# Patient Record
Sex: FEMALE | Race: WHITE | NOT HISPANIC OR LATINO | Employment: FULL TIME | URBAN - METROPOLITAN AREA
[De-identification: names, ages, dates, MRNs, and addresses within clinical notes are randomized per-mention and may not be internally consistent; named-entity substitution may affect disease eponyms.]

---

## 2019-09-10 ENCOUNTER — OFFICE VISIT (OUTPATIENT)
Dept: INTERNAL MEDICINE | Facility: CLINIC | Age: 34
End: 2019-09-10
Payer: COMMERCIAL

## 2019-09-10 VITALS
SYSTOLIC BLOOD PRESSURE: 110 MMHG | DIASTOLIC BLOOD PRESSURE: 80 MMHG | WEIGHT: 164.25 LBS | TEMPERATURE: 98 F | HEIGHT: 67 IN | RESPIRATION RATE: 14 BRPM | HEART RATE: 60 BPM | BODY MASS INDEX: 25.78 KG/M2

## 2019-09-10 DIAGNOSIS — R25.1 TREMOR: Chronic | ICD-10-CM

## 2019-09-10 DIAGNOSIS — G44.89 OTHER HEADACHE SYNDROME: Chronic | ICD-10-CM

## 2019-09-10 DIAGNOSIS — Z01.419 WELL WOMAN EXAM: ICD-10-CM

## 2019-09-10 DIAGNOSIS — Z85.41 HISTORY OF CERVICAL CANCER: ICD-10-CM

## 2019-09-10 DIAGNOSIS — J32.0 CHRONIC MAXILLARY SINUSITIS: Primary | ICD-10-CM

## 2019-09-10 DIAGNOSIS — M79.7 FIBROMYALGIA: ICD-10-CM

## 2019-09-10 DIAGNOSIS — Z80.41 FAMILY HISTORY OF OVARIAN CANCER: ICD-10-CM

## 2019-09-10 PROCEDURE — 99999 PR PBB SHADOW E&M-NEW PATIENT-LVL V: CPT | Mod: PBBFAC,,, | Performed by: INTERNAL MEDICINE

## 2019-09-10 PROCEDURE — 3008F PR BODY MASS INDEX (BMI) DOCUMENTED: ICD-10-PCS | Mod: CPTII,S$GLB,, | Performed by: INTERNAL MEDICINE

## 2019-09-10 PROCEDURE — 99204 OFFICE O/P NEW MOD 45 MIN: CPT | Mod: S$GLB,,, | Performed by: INTERNAL MEDICINE

## 2019-09-10 PROCEDURE — 3008F BODY MASS INDEX DOCD: CPT | Mod: CPTII,S$GLB,, | Performed by: INTERNAL MEDICINE

## 2019-09-10 PROCEDURE — 99999 PR PBB SHADOW E&M-NEW PATIENT-LVL V: ICD-10-PCS | Mod: PBBFAC,,, | Performed by: INTERNAL MEDICINE

## 2019-09-10 PROCEDURE — 99204 PR OFFICE/OUTPT VISIT, NEW, LEVL IV, 45-59 MIN: ICD-10-PCS | Mod: S$GLB,,, | Performed by: INTERNAL MEDICINE

## 2019-09-10 RX ORDER — LEVOFLOXACIN 500 MG/1
500 TABLET, FILM COATED ORAL DAILY
Qty: 10 TABLET | Refills: 0 | Status: SHIPPED | OUTPATIENT
Start: 2019-09-10 | End: 2019-09-20

## 2019-09-10 RX ORDER — PROPRANOLOL HYDROCHLORIDE 20 MG/1
TABLET ORAL
COMMUNITY
Start: 2019-09-02 | End: 2020-05-11 | Stop reason: SDUPTHER

## 2019-09-10 NOTE — PROGRESS NOTES
Subjective:       Patient ID: Socorro Palafox is a 33 y.o. female.    Chief Complaint: Sinusitis and Establish Care    HPI     33-year-old female here for evaluation of a possible sinus drainage that started two years ago.  She reports that she was told this is a cold.  She was given antibiotics. She moved here and went to an ENT.  She was given abx and steroids again. She had surgery in June.  The septum was corrected and the turbinates were opened up.  She was on three rounds of antibiotics.  She still has symptoms.  She went to Dr. Blackwell at [facility].  The surgery was June 24th.  She had an allergy test done that showed nothing significant.  She has been on keflex and prednisone.  Two months later, she repeated this, then again right before the surgery.  She had augmentin for this at first.  While she is on the antibiotics and steroids, she feels better.  The augmentin by itself did not stop the pain in the face, but did stop the fever.  She had a CT of her sinuses, which showed left side of her sinuses impacted and right was half.  The upper sinuses were ok.  She has not had imaging since her surgery.  She has tried flonase to help, but ended up with nose bleeds.  This made everything sticky and dry.  She tried to use this two days ago.  She has not been using afrin chronically.    She has chronic headaches and tremors.  She had an MRI of her brain that showed more fluid than normal for her age and a congenital malformation.    Review of Systems   Constitutional: Negative for chills, fever and unexpected weight change.   HENT: Negative for congestion, postnasal drip and sore throat.    Eyes: Negative for redness and visual disturbance.   Respiratory: Negative for cough and shortness of breath.    Cardiovascular: Negative for chest pain and palpitations.   Gastrointestinal: Negative for abdominal pain, constipation, diarrhea, nausea and vomiting.   Genitourinary: Negative for dysuria, frequency  and hematuria.   Musculoskeletal: Negative for arthralgias and myalgias.   Skin: Negative for color change and rash.   Neurological: Negative for dizziness and headaches.       Objective:      Physical Exam   Constitutional: She is oriented to person, place, and time. She appears well-developed and well-nourished.   HENT:   Head: Normocephalic and atraumatic.   Mouth/Throat: No oropharyngeal exudate.   Eyes: Pupils are equal, round, and reactive to light. EOM are normal. Right eye exhibits no discharge. Left eye exhibits no discharge. No scleral icterus.   Neck: Normal range of motion. Neck supple. No tracheal deviation present. No thyromegaly present.   Cardiovascular: Normal rate, regular rhythm and normal heart sounds. Exam reveals no gallop and no friction rub.   No murmur heard.  Pulmonary/Chest: Effort normal and breath sounds normal. No respiratory distress. She has no wheezes. She has no rales. She exhibits no tenderness.   Abdominal: Soft. Bowel sounds are normal. She exhibits no distension and no mass. There is no tenderness. There is no rebound and no guarding.   Musculoskeletal: Normal range of motion. She exhibits no edema or tenderness.   Neurological: She is alert and oriented to person, place, and time.   Skin: Skin is warm and dry. No rash noted. No erythema. No pallor.   Psychiatric: She has a normal mood and affect. Her behavior is normal.   Vitals reviewed.      Assessment:       1. Chronic maxillary sinusitis    2. Well woman exam    3. History of cervical cancer    4. Family history of ovarian cancer    5. Fibromyalgia    6. Other headache syndrome    7. Tremor        Plan:       1.  Check CT sinuses and refer to ENT.  Try levaquin 500 mg daily.  2/3/4.  Refer to gynecology appear  5.  Monitor  6/7.  Refer to Neurology.  Continue propranolol 20 mg.

## 2019-09-16 ENCOUNTER — TELEPHONE (OUTPATIENT)
Dept: INTERNAL MEDICINE | Facility: CLINIC | Age: 34
End: 2019-09-16

## 2019-09-16 ENCOUNTER — HOSPITAL ENCOUNTER (OUTPATIENT)
Dept: RADIOLOGY | Facility: HOSPITAL | Age: 34
Discharge: HOME | End: 2019-09-16
Attending: INTERNAL MEDICINE
Payer: COMMERCIAL

## 2019-09-16 DIAGNOSIS — J32.0 CHRONIC MAXILLARY SINUSITIS: ICD-10-CM

## 2019-09-16 PROCEDURE — 70486 CT SINUSES WITHOUT CONTRAST: ICD-10-PCS | Mod: 26,,, | Performed by: RADIOLOGY

## 2019-09-16 PROCEDURE — 70486 CT MAXILLOFACIAL W/O DYE: CPT | Mod: 26,,, | Performed by: RADIOLOGY

## 2019-09-16 PROCEDURE — 70486 CT MAXILLOFACIAL W/O DYE: CPT | Mod: TC

## 2019-09-16 NOTE — TELEPHONE ENCOUNTER
Notify pt of Ct results:    - paranasal sinus disease; possible mucus cyst vs thickinging  - possible acute sinusitis  - dr. Rios treated her with levaquin last week; if she still symptomatic?   - pt needs to see ENT; he already placed a referral  - we could try a steroidal nasal spray to see if that helps her symptoms until she sees ENT if she likes

## 2019-09-23 ENCOUNTER — PATIENT OUTREACH (OUTPATIENT)
Dept: ADMINISTRATIVE | Facility: HOSPITAL | Age: 34
End: 2019-09-23

## 2019-09-23 ENCOUNTER — OFFICE VISIT (OUTPATIENT)
Dept: OTOLARYNGOLOGY | Facility: CLINIC | Age: 34
End: 2019-09-23
Payer: COMMERCIAL

## 2019-09-23 VITALS
DIASTOLIC BLOOD PRESSURE: 78 MMHG | BODY MASS INDEX: 24.94 KG/M2 | HEART RATE: 67 BPM | TEMPERATURE: 97 F | SYSTOLIC BLOOD PRESSURE: 106 MMHG | WEIGHT: 158.94 LBS | HEIGHT: 67 IN

## 2019-09-23 DIAGNOSIS — J31.0 CHRONIC RHINITIS: ICD-10-CM

## 2019-09-23 DIAGNOSIS — J32.8 OTHER CHRONIC SINUSITIS: Primary | ICD-10-CM

## 2019-09-23 DIAGNOSIS — J34.3 HYPERTROPHY OF INFERIOR NASAL TURBINATE: ICD-10-CM

## 2019-09-23 PROCEDURE — 31231 NASAL ENDOSCOPY DX: CPT | Mod: S$GLB,,, | Performed by: OTOLARYNGOLOGY

## 2019-09-23 PROCEDURE — 99999 PR PBB SHADOW E&M-EST. PATIENT-LVL III: CPT | Mod: PBBFAC,,, | Performed by: OTOLARYNGOLOGY

## 2019-09-23 PROCEDURE — 31231 PR NASAL ENDOSCOPY, DX: ICD-10-PCS | Mod: S$GLB,,, | Performed by: OTOLARYNGOLOGY

## 2019-09-23 PROCEDURE — 99243 PR OFFICE CONSULTATION,LEVEL III: ICD-10-PCS | Mod: 25,S$GLB,, | Performed by: OTOLARYNGOLOGY

## 2019-09-23 PROCEDURE — 99999 PR PBB SHADOW E&M-EST. PATIENT-LVL III: ICD-10-PCS | Mod: PBBFAC,,, | Performed by: OTOLARYNGOLOGY

## 2019-09-23 PROCEDURE — 99243 OFF/OP CNSLTJ NEW/EST LOW 30: CPT | Mod: 25,S$GLB,, | Performed by: OTOLARYNGOLOGY

## 2019-09-23 NOTE — PROGRESS NOTES
Chief Complaint   Patient presents with    Sinusitis     Referred by Dr.Ryan Rios   .    HPI:     Socorro Palafox is a 33 y.o. female who is referred by Dr. Amanuel Rios MD for evaluation of chronic rhinosinusitis.  She relates that she has had a several year history of chronic rhinosinusitis.  She moved to the oral and area from [place] in January of 2019.  Her sinonasal symptoms worsened and was not controlled by medical management therefore she underwent septoplasty, SMR T, and endoscopic sinus surgery with Dr. Cristina Raman MD in June of 2019. Initially after surgery, Dr. Raman prescribed a 1 month course of compounded rinses Tobramycin and budesonide in July on 2019.  She relates that she had normal allergy testing upon workup with Dr. Raman.    She   presents today for evaluation of a several day history of nasal congestion and postnasal drip.  She does use sinus rinses or nasal sprays. She denies midface pain and pressure.  She admits to rhinorrhea and postnasal drip. There is not maxillary tooth pain. She  admits to headaches.    Most recently she was treated with Levaquin 500mg PO daily for 10 days. She feels this has helped with her symptoms.  She did obtain a CT scan of the sinuses at the initiation of oral antibiotics by Dr. Rios.        Past Medical History:   Diagnosis Date    History of cervical cancer      Social History     Socioeconomic History    Marital status:      Spouse name: Not on file    Number of children: Not on file    Years of education: Not on file    Highest education level: Not on file   Occupational History    Not on file   Social Needs    Financial resource strain: Not on file    Food insecurity:     Worry: Not on file     Inability: Not on file    Transportation needs:     Medical: Not on file     Non-medical: Not on file   Tobacco Use    Smoking status: Never Smoker    Smokeless tobacco: Never Used   Substance and Sexual Activity    Alcohol use: Yes      Frequency: 2-4 times a month     Drinks per session: 1 or 2     Binge frequency: Never    Drug use: Never    Sexual activity: Yes     Partners: Male     Birth control/protection: See Surgical Hx, Surgical   Lifestyle    Physical activity:     Days per week: Not on file     Minutes per session: Not on file    Stress: Not on file   Relationships    Social connections:     Talks on phone: Not on file     Gets together: Not on file     Attends Rastafari service: Not on file     Active member of club or organization: Not on file     Attends meetings of clubs or organizations: Not on file     Relationship status: Not on file   Other Topics Concern    Not on file   Social History Narrative    Not on file     Past Surgical History:   Procedure Laterality Date    HYSTERECTOMY      pylori stenosis surgery      SINUS SURGERY      deviated septum surgery and turbinate reduction     Family History   Problem Relation Age of Onset    Cancer Paternal Aunt         ovarian cancer - 50    Cancer Other         ovarian cancer - 55           Review of Systems  General: negative for chills, fever or weight loss  Psychological: negative for mood changes or depression  Ophthalmic: negative for blurry vision, photophobia or eye pain  ENT: see HPI  Respiratory: no cough, shortness of breath, or wheezing  Cardiovascular: no chest pain or dyspnea on exertion  Gastrointestinal: no abdominal pain, change in bowel habits, or black/ bloody stools  Musculoskeletal: negative for gait disturbance or muscular weakness  Neurological: no syncope or seizures; no ataxia  Dermatological: negative for puritis,  rash and jaundice  Hematologic/lymphatic: no easy bruising, no new lumps or bumps      Physical Exam:    Vitals:    09/23/19 1002   BP: 106/78   Pulse: 67   Temp: 97 °F (36.1 °C)       Constitutional: Well appearing / communicating without difficutly.  NAD.  Eyes: EOM I Bilaterally  Head/Face: Normocephalic.  Negative paranasal sinus  pressure/tenderness.  Salivary glands WNL.  House Brackmann I Bilaterally.    Right Ear: Auricle normal appearance. External Auditory Canal within normal limits,TM w/o masses/lesions/perforations. TM mobility noted.   Left Ear: Auricle normal appearance. External Auditory Canal WNL,TM w/o masses/lesions/perforations. TM mobility noted.  Nose: No gross nasal septal deviation. Inferior Turbinates 3+ bilaterally. No septal perforation. No masses/lesions. External nasal skin appears normal without masses/lesions.  Oral Cavity: Gingiva/lips within normal limits.  Dentition/gingiva healthy appearing. Mucus membranes moist. Floor of mouth soft, no masses palpated. Oral Tongue mobile. Hard Palate appears normal.    Oropharynx: Base of tongue appears normal. No masses/lesions noted. Tonsillar fossa/pharyngeal wall without lesions. Posterior oropharynx WNL.  Soft palate without masses. Midline uvula.   Neck/Lymphatic: No LAD I-VI bilaterally.  No thyromegaly.  No masses noted on exam.    Mirror laryngoscopy/nasopharyngoscopy: Active gag reflex.  Unable to perform.    Neuro/Psychiatric: AOx3.  Normal mood and affect.   Cardiovascular: Normal carotid pulses bilaterally, no increasing jugular venous distention noted at cervical region bilaterally.    Respiratory: Normal respiratory effort, no stridor, no retractions noted.      See separate procedure note for nasal endoscopy.       Assessment:    ICD-10-CM ICD-9-CM    1. Other chronic sinusitis J32.8 473.8    2. Hypertrophy of inferior nasal turbinate J34.3 478.0    3. Chronic rhinitis J31.0 472.0      The primary encounter diagnosis was Other chronic sinusitis. Diagnoses of Hypertrophy of inferior nasal turbinate and Chronic rhinitis were also pertinent to this visit.      Plan:  No orders of the defined types were placed in this encounter.    Obtain records from Dr. Raman.   We discussed in detail that her nasal endoscopy and CT scan of the sinuses shows that the maxillary  sinuses and ethmoid cavities are patent.  At this point I would not recommend any further surgery but would continue maximal medical management.  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  She was counseled on the off-label nature of this therapy and she consented to its use. Follow up in 6-8 weeks for recheck.     Thank you kindly for allowing me to participate in the patient's care.     Ban Tracey MD

## 2019-09-23 NOTE — PROCEDURES
Procedures     PROCEDURE NOTE:  Nasal endoscopy   Preprocedure diagnosis:  Chronic sinusitis  Postprocedure diangosis:  Same  Complications:  None  Blood Loss:  None    Procedure in detail:  After verbal consent was obtained, the patient's nasal cavity was anesthesized using topical 1%lidocaine and Neosynepherine.  A rigid 0 degree endoscope was placed in first the right, then the left nasal cavity.  The inferior and middle turbinates were examined, and found to be mildly edematous  bilaterally.  The middle meatus and maxillary antrum was also examined, and found to be widely bilaterally.  No purulent drainage or masses seen.  The patient tolerated the procedure well and there were no complications.

## 2019-09-23 NOTE — LETTER
September 23, 2019      Amanuel Rios MD  [address] [facility]  [place] [address]           [place] - Otorhinolaryngology  [address]  [facility] [address]  Phone: [phone]  Fax: [phone]          Patient: Socorro Palafox   MR Number: [id number]   YOB: 1985   Date of Visit: 9/23/2019       Dear Dr. Amanuel Rios:    Thank you for referring Socorro Palafox to me for evaluation. Attached you will find relevant portions of my assessment and plan of care.    If you have questions, please do not hesitate to call me. I look forward to following Socorro Palafox along with you.    Sincerely,    Ban Tracey MD    Enclosure  CC:  No Recipients    If you would like to receive this communication electronically, please contact [email or web address] or [phone] to request more information on [employer] Link access.    For providers and/or their staff who would like to refer a patient to [facility], please contact us through our one-stop-shop provider referral line, [facility], at [phone].    If you feel you have received this communication in error or would no longer like to receive these types of communications, please e-mail [email or web address]

## 2019-09-23 NOTE — LETTER
AUTHORIZATION FOR RELEASE OF   CONFIDENTIAL INFORMATION    Dear Dr. Raman,    We are seeing Socorro Palafox, date of birth 1985, in the clinic at [facility] INTERNAL MEDICINE. Amanuel Rios MD is the patient's PCP. Socorro Palafox has an outstanding lab/procedure at the time we reviewed her chart. In order to help keep her health information updated, she has authorized us to request the following medical record(s):        (  )  MAMMOGRAM                                      (  )  COLONOSCOPY      (  )  PAP SMEAR                                          (  )  OUTSIDE LAB RESULTS     (  )  DEXA SCAN                                          (  )  EYE EXAM            (  )  FOOT EXAM                                          (  )  ENTIRE RECORD     (  )  OUTSIDE IMMUNIZATIONS                 ( X ) ENT PROCEDURE         Please fax records to [facility], Ryan Lee, MD, [phone]     If you have any questions, please contact JOANN Floyd at [phone]          Patient Name: Socorro Palafox  : 1985  Patient Phone #: [phone]

## 2019-10-08 ENCOUNTER — OFFICE VISIT (OUTPATIENT)
Dept: OBSTETRICS AND GYNECOLOGY | Facility: CLINIC | Age: 34
End: 2019-10-08
Attending: INTERNAL MEDICINE
Payer: COMMERCIAL

## 2019-10-08 ENCOUNTER — PATIENT MESSAGE (OUTPATIENT)
Dept: OBSTETRICS AND GYNECOLOGY | Facility: CLINIC | Age: 34
End: 2019-10-08

## 2019-10-08 VITALS
BODY MASS INDEX: 24.86 KG/M2 | SYSTOLIC BLOOD PRESSURE: 116 MMHG | WEIGHT: 158.75 LBS | DIASTOLIC BLOOD PRESSURE: 68 MMHG

## 2019-10-08 DIAGNOSIS — Z01.419 ENCOUNTER FOR GYNECOLOGICAL EXAMINATION WITHOUT ABNORMAL FINDING: Primary | ICD-10-CM

## 2019-10-08 PROCEDURE — 99499 UNLISTED E&M SERVICE: CPT | Mod: S$GLB,,, | Performed by: OBSTETRICS & GYNECOLOGY

## 2019-10-08 PROCEDURE — 99999 PR PBB SHADOW E&M-EST. PATIENT-LVL III: CPT | Mod: PBBFAC,,, | Performed by: OBSTETRICS & GYNECOLOGY

## 2019-10-08 PROCEDURE — 99999 PR PBB SHADOW E&M-EST. PATIENT-LVL III: ICD-10-PCS | Mod: PBBFAC,,, | Performed by: OBSTETRICS & GYNECOLOGY

## 2019-10-08 PROCEDURE — 99499 NO LOS: ICD-10-PCS | Mod: S$GLB,,, | Performed by: OBSTETRICS & GYNECOLOGY

## 2019-10-08 RX ORDER — BUDESONIDE 0.5 MG/2ML
INHALANT ORAL
Refills: 1 | COMMUNITY
Start: 2019-09-24 | End: 2020-12-21 | Stop reason: ALTCHOICE

## 2019-10-08 RX ORDER — CEFTRIAXONE 500 MG/1
INJECTION, POWDER, FOR SOLUTION INTRAMUSCULAR; INTRAVENOUS
Refills: 1 | COMMUNITY
Start: 2019-09-24 | End: 2020-04-27

## 2019-10-08 NOTE — LETTER
October 8, 2019      Amanuel Rios MD  [address] [facility]  [place] [address]           [place] - Obstetrics and Gynecology  [address] [address]  [place] [address]  Phone: [phone]  Fax: [phone]          Patient: Socorro Palafox   MR Number: [id number]   YOB: 1985   Date of Visit: 10/8/2019       Dear Dr. Amanuel Rios:    Thank you for referring Socorro Palafox to me for evaluation. Attached you will find relevant portions of my assessment and plan of care.    If you have questions, please do not hesitate to call me. I look forward to following Socorro Palafox along with you.    Sincerely,    Sarah Henry MD    Enclosure  CC:  No Recipients    If you would like to receive this communication electronically, please contact [email or web address] or [phone] to request more information on [employer] Link access.    For providers and/or their staff who would like to refer a patient to [facility], please contact us through our one-stop-shop provider referral line, [facility], at [phone].    If you feel you have received this communication in error or would no longer like to receive these types of communications, please e-mail [email or web address]

## 2019-10-09 ENCOUNTER — TELEPHONE (OUTPATIENT)
Dept: OBSTETRICS AND GYNECOLOGY | Facility: CLINIC | Age: 34
End: 2019-10-09

## 2019-10-09 NOTE — TELEPHONE ENCOUNTER
----- Message from Yumiko Raza sent at 10/9/2019  9:19 AM CDT -----  Contact: PAULINO BENNETT [[id number]]  Name of Who is Calling: PAULINO BENNETT [[id number]]     What is the request in detail:PAULINO BENNETT [[id number]] is requesting a sooner appointment .. Patient had appointment on yesterday but the DrRashaad Was not there  Please contact to further discuss and advise      Can the clinic reply by [facility]: no     What Number to Call Back if not in [facility]:  [phone]

## 2019-10-09 NOTE — PROGRESS NOTES
Patient left before being seen    Patient was called today to see when she wanted come to back to the office to be seen first patient in the am or first patient in the afternoon.  AParise

## 2019-11-08 ENCOUNTER — OFFICE VISIT (OUTPATIENT)
Dept: NEUROLOGY | Facility: CLINIC | Age: 34
End: 2019-11-08
Payer: COMMERCIAL

## 2019-11-08 ENCOUNTER — LAB VISIT (OUTPATIENT)
Dept: LAB | Facility: HOSPITAL | Age: 34
End: 2019-11-08
Attending: PSYCHIATRY & NEUROLOGY
Payer: COMMERCIAL

## 2019-11-08 VITALS
HEIGHT: 67 IN | SYSTOLIC BLOOD PRESSURE: 107 MMHG | HEART RATE: 63 BPM | BODY MASS INDEX: 24.99 KG/M2 | DIASTOLIC BLOOD PRESSURE: 71 MMHG | WEIGHT: 159.19 LBS

## 2019-11-08 DIAGNOSIS — R51.9 CHRONIC NONINTRACTABLE HEADACHE, UNSPECIFIED HEADACHE TYPE: ICD-10-CM

## 2019-11-08 DIAGNOSIS — M79.7 FIBROMYALGIA: ICD-10-CM

## 2019-11-08 DIAGNOSIS — R93.0 ABNORMAL MRI OF HEAD: ICD-10-CM

## 2019-11-08 DIAGNOSIS — R25.1 TREMOR: Primary | ICD-10-CM

## 2019-11-08 DIAGNOSIS — R25.1 TREMOR: ICD-10-CM

## 2019-11-08 DIAGNOSIS — G89.29 CHRONIC NONINTRACTABLE HEADACHE, UNSPECIFIED HEADACHE TYPE: ICD-10-CM

## 2019-11-08 LAB
ALBUMIN SERPL BCP-MCNC: 4.2 G/DL (ref 3.5–5.2)
ALP SERPL-CCNC: 44 U/L (ref 55–135)
ALT SERPL W/O P-5'-P-CCNC: 9 U/L (ref 10–44)
ANION GAP SERPL CALC-SCNC: 8 MMOL/L (ref 8–16)
AST SERPL-CCNC: 13 U/L (ref 10–40)
BASOPHILS # BLD AUTO: 0.01 K/UL (ref 0–0.2)
BASOPHILS NFR BLD: 0.2 % (ref 0–1.9)
BILIRUB SERPL-MCNC: 0.7 MG/DL (ref 0.1–1)
BUN SERPL-MCNC: 14 MG/DL (ref 6–20)
CALCIUM SERPL-MCNC: 9.5 MG/DL (ref 8.7–10.5)
CHLORIDE SERPL-SCNC: 103 MMOL/L (ref 95–110)
CO2 SERPL-SCNC: 28 MMOL/L (ref 23–29)
CREAT SERPL-MCNC: 0.8 MG/DL (ref 0.5–1.4)
DIFFERENTIAL METHOD: ABNORMAL
EOSINOPHIL # BLD AUTO: 0.1 K/UL (ref 0–0.5)
EOSINOPHIL NFR BLD: 1.9 % (ref 0–8)
ERYTHROCYTE [DISTWIDTH] IN BLOOD BY AUTOMATED COUNT: 13 % (ref 11.5–14.5)
EST. GFR  (AFRICAN AMERICAN): >60 ML/MIN/1.73 M^2
EST. GFR  (NON AFRICAN AMERICAN): >60 ML/MIN/1.73 M^2
GLUCOSE SERPL-MCNC: 90 MG/DL (ref 70–110)
HCT VFR BLD AUTO: 41.4 % (ref 37–48.5)
HGB BLD-MCNC: 13 G/DL (ref 12–16)
LYMPHOCYTES # BLD AUTO: 1.6 K/UL (ref 1–4.8)
LYMPHOCYTES NFR BLD: 25.8 % (ref 18–48)
MCH RBC QN AUTO: 26.7 PG (ref 27–31)
MCHC RBC AUTO-ENTMCNC: 31.4 G/DL (ref 32–36)
MCV RBC AUTO: 85 FL (ref 82–98)
MONOCYTES # BLD AUTO: 0.6 K/UL (ref 0.3–1)
MONOCYTES NFR BLD: 8.9 % (ref 4–15)
NEUTROPHILS # BLD AUTO: 3.9 K/UL (ref 1.8–7.7)
NEUTROPHILS NFR BLD: 63.2 % (ref 38–73)
PLATELET # BLD AUTO: 166 K/UL (ref 150–350)
PMV BLD AUTO: 9.9 FL (ref 9.2–12.9)
POTASSIUM SERPL-SCNC: 4.2 MMOL/L (ref 3.5–5.1)
PROT SERPL-MCNC: 8 G/DL (ref 6–8.4)
RBC # BLD AUTO: 4.86 M/UL (ref 4–5.4)
SODIUM SERPL-SCNC: 139 MMOL/L (ref 136–145)
TSH SERPL DL<=0.005 MIU/L-ACNC: 2.34 UIU/ML (ref 0.4–4)
WBC # BLD AUTO: 6.16 K/UL (ref 3.9–12.7)

## 2019-11-08 PROCEDURE — 99204 PR OFFICE/OUTPT VISIT, NEW, LEVL IV, 45-59 MIN: ICD-10-PCS | Mod: S$GLB,,, | Performed by: PSYCHIATRY & NEUROLOGY

## 2019-11-08 PROCEDURE — 85025 COMPLETE CBC W/AUTO DIFF WBC: CPT

## 2019-11-08 PROCEDURE — 82390 ASSAY OF CERULOPLASMIN: CPT

## 2019-11-08 PROCEDURE — 3008F PR BODY MASS INDEX (BMI) DOCUMENTED: ICD-10-PCS | Mod: CPTII,S$GLB,, | Performed by: PSYCHIATRY & NEUROLOGY

## 2019-11-08 PROCEDURE — 82525 ASSAY OF COPPER: CPT

## 2019-11-08 PROCEDURE — 36415 COLL VENOUS BLD VENIPUNCTURE: CPT

## 2019-11-08 PROCEDURE — 99999 PR PBB SHADOW E&M-EST. PATIENT-LVL III: ICD-10-PCS | Mod: PBBFAC,,, | Performed by: PSYCHIATRY & NEUROLOGY

## 2019-11-08 PROCEDURE — 82300 ASSAY OF CADMIUM: CPT

## 2019-11-08 PROCEDURE — 80053 COMPREHEN METABOLIC PANEL: CPT

## 2019-11-08 PROCEDURE — 3008F BODY MASS INDEX DOCD: CPT | Mod: CPTII,S$GLB,, | Performed by: PSYCHIATRY & NEUROLOGY

## 2019-11-08 PROCEDURE — 99999 PR PBB SHADOW E&M-EST. PATIENT-LVL III: CPT | Mod: PBBFAC,,, | Performed by: PSYCHIATRY & NEUROLOGY

## 2019-11-08 PROCEDURE — 84443 ASSAY THYROID STIM HORMONE: CPT

## 2019-11-08 PROCEDURE — 99204 OFFICE O/P NEW MOD 45 MIN: CPT | Mod: S$GLB,,, | Performed by: PSYCHIATRY & NEUROLOGY

## 2019-11-08 PROCEDURE — 84630 ASSAY OF ZINC: CPT

## 2019-11-08 NOTE — PROGRESS NOTES
Neurology Clinic Visit  Primary Care Provider: Amanuel Rios MD   Referring Provider: Self, Aaareferral   Date of Visit: 11/08/2019       chief complaint:   Chief Complaint   Patient presents with    Headache       History of Present Illness  Socorro Palafox is a 34 y.o.female I have been asked to consult for evaluation of headaches and tremors.  Patient reports she has had headaches since 11 years old.  Headaches are usually bifrontal but sometimes posterior head and associated with nausea, sometimes vomiting, light and noise sensitivity.  She reports headaches were daily into she was started on propranolol about 4 years ago.  Now headaches are about once per month.  Headaches typically last from a day to week.  She also reports about 5 years ago she developed tremors that initially were all over her body but this improve with propranolol.  She was previously on the propranolol 40 mg twice a day this was decreased to 20 mg twice a day as the higher dose caused her to have be hypertensive and bradycardia.  She has tolerate the 20 mg twice a day of propranolol well without any side effects.  She does report that her tremors are intermittent and today during the encounter they were  Not present. She reports having MRI of the brain in the past which showed per her report extra fluid on the brain.  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  She does report that she was born 1 month early.  When her mom was pregnant with her, she had a heart surgery in the 2nd trimester during to the patient.    Patient Active Problem List    Diagnosis Date Noted    Chronic maxillary sinusitis 09/10/2019    History of cervical cancer 09/10/2019    Family history of ovarian cancer 09/10/2019    Fibromyalgia 09/10/2019    Other headache syndrome 09/10/2019    Tremor 09/10/2019     Past Medical History:   Diagnosis Date    History of cervical cancer      Past Surgical  History:   Procedure Laterality Date    HYSTERECTOMY      pylori stenosis surgery      SINUS SURGERY      deviated septum surgery and turbinate reduction     Family History   Problem Relation Age of Onset    Cancer Paternal Aunt         ovarian cancer - 50    Cancer Other         ovarian cancer - 55         Current Outpatient Medications   Medication Sig    budesonide (PULMICORT) 0.5 mg/2 mL nebulizer solution EMPTY CONTENTS OF 1 RESPULE INTO NASAL IRRIGATION SYSTEM, ADD DISTILLED WATER, SALT PACK, MIX & IRRIGATE. PERFORM TWICE DAILY    cefTRIAXone (ROCEPHIN) 500 mg injection EMPTY CONTENTS OF 1 VIAL INTO NASAL IRRIGATION SYSTEM, ADD DISTILLED WATER, SALT PACK, MIX & IRRIGATE PERFORM 2 TIMES DAILY. (2/DAY)    propranolol (INDERAL) 20 MG tablet      No current facility-administered medications for this visit.          Review of patient's allergies indicates:   Allergen Reactions    Codeine Shortness Of Breath    Hydrocodone Shortness Of Breath    Oxycontin [oxycodone] Shortness Of Breath    Zofran [ondansetron hcl (pf)] Shortness Of Breath     Social History     Socioeconomic History    Marital status:      Spouse name: Not on file    Number of children: Not on file    Years of education: Not on file    Highest education level: Not on file   Occupational History    Not on file   Social Needs    Financial resource strain: Not on file    Food insecurity:     Worry: Not on file     Inability: Not on file    Transportation needs:     Medical: Not on file     Non-medical: Not on file   Tobacco Use    Smoking status: Never Smoker    Smokeless tobacco: Never Used   Substance and Sexual Activity    Alcohol use: Yes     Frequency: 2-4 times a month     Drinks per session: 1 or 2     Binge frequency: Never    Drug use: Never    Sexual activity: Yes     Partners: Male     Birth control/protection: See Surgical Hx, Surgical   Lifestyle    Physical activity:     Days per week: Not on file      "Minutes per session: Not on file    Stress: Not on file   Relationships    Social connections:     Talks on phone: Not on file     Gets together: Not on file     Attends Yarsanism service: Not on file     Active member of club or organization: Not on file     Attends meetings of clubs or organizations: Not on file     Relationship status: Not on file   Other Topics Concern    Not on file   Social History Narrative    Not on file       Review of Systems    Constitutional: negative  Eyes: negative  Ears, nose, mouth, throat, and face: negative  Respiratory: negative  Cardiovascular: negative  Gastrointestinal: negative  Genitourinary:negative  Integument/breast: negative  Hematologic/lymphatic: negative  Musculoskeletal:negative  Neurological: negative  Behavioral/Psych: negative  Endocrine: negative  Allergic/Immunologic: negative    Objective:  Vital signs in last 24 hours:    Vitals:    11/08/19 1514   BP: 107/71   Pulse: 63   Weight: 72.2 kg (159 lb 2.8 oz)   Height: 5' 7" (1.702 m)       Body mass index is 24.93 kg/m².     General: no acute distress, well nourished, well-groomed  CVS: RRR, no murmur, gallops or rubs  Respiratory: Clear to ausculation  Extremities: no edema    Neurological Examination:    HIGHER INTEGRATIVE FUNCTIONS:  -Normal attention span and concentration; immediately responds to questions and commands  -Oriented to time, place and person  -Recent and remote memory intact  -Language normal (no aphasia or dysarthria)  -Normal fund of knowledge    CN:  -PERRLA, visual fields full, unable to visualize optic discs due to small pupils on fundus exam   -EOMI with normal saccades and smooth pursuit  -Facial sensation intact bilaterally  -Facial strength/movement intact bilaterally  -Hearing intact to voice  -Palate elevates symmetrically  -Normal shoulder shrug and head turn  -Tongue protrudes midline    MOTOR: (left/right graded 1-5)  -UE: 5/5 deltoids; 5/5 biceps, triceps; 5/5 wrist flexors, " extensors; 5/5 interosseous; 5/5   -LEs: 5/5 hip flexion, extension; 5/5 knee flexion, extension; 5/5 ankle flexion, extension  -Tone: normal  -No pronator drift, no orbiting    SENSORY:  -Light touch, temperature sensation intact bilaterally    REFLEXES:  -2+ upper and lower bilaterally  -Flexor plantar reflex bilaterally  -No clonus    COORDINATION:  -FNF, HKS, ARLEN intact bilaterally    GAIT:  -Normal casual gait       Assessment/Plan:    1.  Chronic headaches:  Features suggestive of migraines  2.  Tremor:  Not present on examination today  3.  Fibromyalgia, per PCP  4.  Abnormal MRI    Plan:  She can continue propranolol for migraine preventative as she has responded well to this.  I will obtain labs for further investigation of tremor  I will also obtain MRI of the brain given her tremor and reported abnormal MRI in the past which seems to be hydrocephalus based on the description.    I discussed assessment and plan with patient and answered the questions that she had.     Part of patient note might have been created using Dragon Dictation.  Any errors in syntax or even information may not have been identified and edited on initial review prior to signing this note.

## 2019-11-11 LAB
ARSENIC BLD-MCNC: <1 NG/ML (ref 0–12)
CADMIUM BLD-MCNC: 0.2 NG/ML (ref 0–4.9)
CERULOPLASMIN SERPL-MCNC: 30 MG/DL (ref 15–45)
CITY: NORMAL
COPPER SERPL-MCNC: 1025 UG/L (ref 810–1990)
COUNTY: NORMAL
GUARDIAN FIRST NAME: NORMAL
GUARDIAN LAST NAME: NORMAL
HOME PHONE: NORMAL
LEAD BLD-MCNC: <1 MCG/DL (ref 0–4.9)
MERCURY BLD-MCNC: <1 NG/ML (ref 0–9)
RACE: NORMAL
STATE: NORMAL
STREET ADDRESS: NORMAL
VENOUS/CAPILLARY: NORMAL
ZINC SERPL-MCNC: 61 UG/DL (ref 60–130)
ZIP: NORMAL

## 2019-11-12 ENCOUNTER — OFFICE VISIT (OUTPATIENT)
Dept: OTOLARYNGOLOGY | Facility: CLINIC | Age: 34
End: 2019-11-12
Payer: COMMERCIAL

## 2019-11-12 VITALS
WEIGHT: 161.69 LBS | BODY MASS INDEX: 25.38 KG/M2 | DIASTOLIC BLOOD PRESSURE: 69 MMHG | SYSTOLIC BLOOD PRESSURE: 107 MMHG | HEIGHT: 67 IN | HEART RATE: 68 BPM

## 2019-11-12 DIAGNOSIS — J31.0 CHRONIC RHINITIS: ICD-10-CM

## 2019-11-12 DIAGNOSIS — J34.3 HYPERTROPHY OF INFERIOR NASAL TURBINATE: ICD-10-CM

## 2019-11-12 DIAGNOSIS — J32.8 OTHER CHRONIC SINUSITIS: Primary | ICD-10-CM

## 2019-11-12 PROCEDURE — 99999 PR PBB SHADOW E&M-EST. PATIENT-LVL III: ICD-10-PCS | Mod: PBBFAC,,, | Performed by: OTOLARYNGOLOGY

## 2019-11-12 PROCEDURE — 99214 OFFICE O/P EST MOD 30 MIN: CPT | Mod: 25,S$GLB,, | Performed by: OTOLARYNGOLOGY

## 2019-11-12 PROCEDURE — 3008F BODY MASS INDEX DOCD: CPT | Mod: CPTII,S$GLB,, | Performed by: OTOLARYNGOLOGY

## 2019-11-12 PROCEDURE — 31231 PR NASAL ENDOSCOPY, DX: ICD-10-PCS | Mod: S$GLB,,, | Performed by: OTOLARYNGOLOGY

## 2019-11-12 PROCEDURE — 31231 NASAL ENDOSCOPY DX: CPT | Mod: S$GLB,,, | Performed by: OTOLARYNGOLOGY

## 2019-11-12 PROCEDURE — 3008F PR BODY MASS INDEX (BMI) DOCUMENTED: ICD-10-PCS | Mod: CPTII,S$GLB,, | Performed by: OTOLARYNGOLOGY

## 2019-11-12 PROCEDURE — 99214 PR OFFICE/OUTPT VISIT, EST, LEVL IV, 30-39 MIN: ICD-10-PCS | Mod: 25,S$GLB,, | Performed by: OTOLARYNGOLOGY

## 2019-11-12 PROCEDURE — 99999 PR PBB SHADOW E&M-EST. PATIENT-LVL III: CPT | Mod: PBBFAC,,, | Performed by: OTOLARYNGOLOGY

## 2019-11-12 NOTE — PROGRESS NOTES
Chief Complaint   Patient presents with    Follow-up     post nasal drip, eye drainage    Otalgia     bilateral   .    HPI:     Socorro Palafox is a 34 y.o. female who is referred by Dr. Amanuel Rios MD for evaluation of chronic rhinosinusitis.  She relates that she has had a several year history of chronic rhinosinusitis.  She moved to the oral and area from [place] in January of 2019.  Her sinonasal symptoms worsened and was not controlled by medical management therefore she underwent septoplasty, SMR T, and endoscopic sinus surgery with Dr. Cristina Raman MD in June of 2019. Initially after surgery, Dr. Raman prescribed a 1 month course of compounded rinses Tobramycin and budesonide in July on 2019.  She relates that she had normal allergy testing upon workup with Dr. Raman.    She   presents today for evaluation of a several day history of nasal congestion and postnasal drip.  She does use sinus rinses or nasal sprays. She denies midface pain and pressure.  She admits to rhinorrhea and postnasal drip. There is not maxillary tooth pain. She  admits to headaches.    Most recently she was treated with Levaquin 500mg PO daily for 10 days. She feels this has helped with her symptoms.  She did obtain a CT scan of the sinuses at the initiation of oral antibiotics by Dr. Rios.      Interval HPI 11/12/2019:  Mrs. Ruano follows up today for CRS. She reports that she is doing better. She reports that that she is noticing decreased nasal congestion and post-nasal drip since last visit approximately 2 months ago.  She denies facial pain or pressure.  She notes that she has been using compounded nasal saline rinses as prescribed and this has been helpful.        Past Medical History:   Diagnosis Date    History of cervical cancer      Social History     Socioeconomic History    Marital status:      Spouse name: Not on file    Number of children: Not on file    Years of education: Not on file    Highest  education level: Not on file   Occupational History    Not on file   Social Needs    Financial resource strain: Not on file    Food insecurity:     Worry: Not on file     Inability: Not on file    Transportation needs:     Medical: Not on file     Non-medical: Not on file   Tobacco Use    Smoking status: Never Smoker    Smokeless tobacco: Never Used   Substance and Sexual Activity    Alcohol use: Yes     Frequency: 2-4 times a month     Drinks per session: 1 or 2     Binge frequency: Never    Drug use: Never    Sexual activity: Yes     Partners: Male     Birth control/protection: See Surgical Hx, Surgical   Lifestyle    Physical activity:     Days per week: Not on file     Minutes per session: Not on file    Stress: Not on file   Relationships    Social connections:     Talks on phone: Not on file     Gets together: Not on file     Attends [employer] service: Not on file     Active member of club or organization: Not on file     Attends meetings of clubs or organizations: Not on file     Relationship status: Not on file   Other Topics Concern    Not on file   Social History Narrative    Not on file     Past Surgical History:   Procedure Laterality Date    HYSTERECTOMY      pylori stenosis surgery      SINUS SURGERY      deviated septum surgery and turbinate reduction     Family History   Problem Relation Age of Onset    Cancer Paternal Aunt         ovarian cancer - 50    Cancer Other         ovarian cancer - 55           Review of Systems  General: negative for chills, fever or weight loss  Psychological: negative for mood changes or depression  Ophthalmic: negative for blurry vision, photophobia or eye pain  ENT: see HPI  Respiratory: no cough, shortness of breath, or wheezing  Cardiovascular: no chest pain or dyspnea on exertion  Gastrointestinal: no abdominal pain, change in bowel habits, or black/ bloody stools  Musculoskeletal: negative for gait disturbance or muscular weakness  Neurological:  no syncope or seizures; no ataxia  Dermatological: negative for puritis,  rash and jaundice  Hematologic/lymphatic: no easy bruising, no new lumps or bumps      Physical Exam:    Vitals:    11/12/19 1532   BP: 107/69   Pulse: 68       Constitutional: Well appearing / communicating without difficutly.  NAD.  Eyes: EOM I Bilaterally  Head/Face: Normocephalic.  Negative paranasal sinus pressure/tenderness.  Salivary glands WNL.  House Brackmann I Bilaterally.    Right Ear: Auricle normal appearance. External Auditory Canal within normal limits,TM w/o masses/lesions/perforations. TM mobility noted.   Left Ear: Auricle normal appearance. External Auditory Canal WNL,TM w/o masses/lesions/perforations. TM mobility noted.  Nose: No gross nasal septal deviation. Inferior Turbinates 3+ bilaterally. No septal perforation. No masses/lesions. External nasal skin appears normal without masses/lesions.  Oral Cavity: Gingiva/lips within normal limits.  Dentition/gingiva healthy appearing. Mucus membranes moist. Floor of mouth soft, no masses palpated. Oral Tongue mobile. Hard Palate appears normal.    Oropharynx: Base of tongue appears normal. No masses/lesions noted. Tonsillar fossa/pharyngeal wall without lesions. Posterior oropharynx WNL.  Soft palate without masses. Midline uvula.   Neck/Lymphatic: No LAD I-VI bilaterally.  No thyromegaly.  No masses noted on exam.    Mirror laryngoscopy/nasopharyngoscopy: Active gag reflex.  Unable to perform.    Neuro/Psychiatric: AOx3.  Normal mood and affect.   Cardiovascular: Normal carotid pulses bilaterally, no increasing jugular venous distention noted at cervical region bilaterally.    Respiratory: Normal respiratory effort, no stridor, no retractions noted.      See separate procedure note for nasal endoscopy.       Assessment:    ICD-10-CM ICD-9-CM    1. Other chronic sinusitis J32.8 473.8 Ambulatory Referral to ENT   2. Hypertrophy of inferior nasal turbinate J34.3 478.0    3.  Chronic rhinitis J31.0 472.0      The primary encounter diagnosis was Other chronic sinusitis. Diagnoses of Hypertrophy of inferior nasal turbinate and Chronic rhinitis were also pertinent to this visit.      Plan:  Orders Placed This Encounter   Procedures    Ambulatory Referral to ENT     Obtain records from Dr. Raman.   Continue ceftriaxone and budesonide in isotonic saline irrigation alternating with non-medicated saline rinses QOD. Stop compounded rinses after 1 month and utilize isotonic nasal saline rinses daily.She was counseled on the off-label nature of this therapy and she consented to its use. Follow up in 2 months for recheck.     Thank you kindly for allowing me to participate in the patient's care.     Ban Tracey MD

## 2019-11-17 NOTE — PROCEDURES
Procedures       PROCEDURE NOTE:  Nasal endoscopy   Preprocedure diagnosis:  Chronic sinusitis  Postprocedure diangosis:  Same  Complications:  None  Blood Loss:  None    Procedure in detail:  After verbal consent was obtained, the patient's nasal cavity was anesthesized using topical 1%lidocaine and Neosynepherine.  A rigid 0 degree endoscope was placed in first the right, then the left nasal cavity.  The inferior and middle turbinates were examined, and found to be slightly edematous  bilaterally.  The middle meatus and maxillary antrum was also examined, and found to be widely patent bilaterally.  No purulent drainage or masses seen.  The patient tolerated the procedure well and there were no complications.

## 2019-11-21 ENCOUNTER — OFFICE VISIT (OUTPATIENT)
Dept: OTOLARYNGOLOGY | Facility: CLINIC | Age: 34
End: 2019-11-21
Payer: COMMERCIAL

## 2019-11-21 VITALS
HEART RATE: 68 BPM | SYSTOLIC BLOOD PRESSURE: 112 MMHG | HEIGHT: 67 IN | DIASTOLIC BLOOD PRESSURE: 70 MMHG | WEIGHT: 158.75 LBS | BODY MASS INDEX: 24.92 KG/M2

## 2019-11-21 DIAGNOSIS — M79.7 FIBROMYALGIA: ICD-10-CM

## 2019-11-21 DIAGNOSIS — H68.003 SALPINGITIS OF BOTH EUSTACHIAN TUBES: ICD-10-CM

## 2019-11-21 DIAGNOSIS — J32.8 OTHER CHRONIC SINUSITIS: ICD-10-CM

## 2019-11-21 DIAGNOSIS — J31.0 NONALLERGIC RHINITIS: Primary | ICD-10-CM

## 2019-11-21 PROCEDURE — 99999 PR PBB SHADOW E&M-EST. PATIENT-LVL III: CPT | Mod: PBBFAC,,, | Performed by: OTOLARYNGOLOGY

## 2019-11-21 PROCEDURE — 3008F PR BODY MASS INDEX (BMI) DOCUMENTED: ICD-10-PCS | Mod: CPTII,S$GLB,, | Performed by: OTOLARYNGOLOGY

## 2019-11-21 PROCEDURE — 99214 PR OFFICE/OUTPT VISIT, EST, LEVL IV, 30-39 MIN: ICD-10-PCS | Mod: 25,S$GLB,, | Performed by: OTOLARYNGOLOGY

## 2019-11-21 PROCEDURE — 31231 NASAL ENDOSCOPY DX: CPT | Mod: S$GLB,,, | Performed by: OTOLARYNGOLOGY

## 2019-11-21 PROCEDURE — 99999 PR PBB SHADOW E&M-EST. PATIENT-LVL III: ICD-10-PCS | Mod: PBBFAC,,, | Performed by: OTOLARYNGOLOGY

## 2019-11-21 PROCEDURE — 3008F BODY MASS INDEX DOCD: CPT | Mod: CPTII,S$GLB,, | Performed by: OTOLARYNGOLOGY

## 2019-11-21 PROCEDURE — 31231 NASAL/SINUS ENDOSCOPY: ICD-10-PCS | Mod: S$GLB,,, | Performed by: OTOLARYNGOLOGY

## 2019-11-21 PROCEDURE — 99214 OFFICE O/P EST MOD 30 MIN: CPT | Mod: 25,S$GLB,, | Performed by: OTOLARYNGOLOGY

## 2019-11-21 NOTE — LETTER
2019    Ban Tracey MD  [address]  [address]  [place] [address]           OTOLARYNGOLOGY AND COMMUNICATION SCIENCES    Bj Fox MD, FACS          SURGICAL AND MEDICAL DISEASES OF HEAD AND NECK  MD Bj Summers MD, FACS  Jonathon Arauz III, MD    OTOLOGY, NEUROTOLOGY and SKULL-BASE SURGERY  Ty Ocampo MD, FACS  Bill Ruiz MD, Director    PEDIATRIC OTOLARYNGOLOGY & OTOLOGY  FRANCA Ortiz MD, FAAP  Sheila Bhatt MD, FACS    FACIAL PLASTIC and RECONSTRUCTIVE SURGERY  ZANA Leahy III, MD, FACS    RHINOLOGY and SINUS SURGERY  Clyde Lee MD, MPH, FACS  Director   ZANA Leahy III, MD, FACS    LARYNGOLOGY  Amadou Wong MD    SPEECH-LANGUAGE PATHOLOGY  Yajaira Lopez, PhD, [facility]-SLP  Kierra Green, MS, CCC-SLP  Joya Reilly, MS, CCC-SLP  Naomi Galicia MA, [facility]-SLP    AUDIOLOGY SECTION  Shari Nam, MS, CCC-A  JOCELINE Perez, CCC-A  Rosemarie Polanco, CCC-A  Rosemarie Rodas, CCC-A  Lokesh Song Jr., JOCELINE, CCA-A  JOCELINE Reyes, CCC-A  Rosemarie Conde, CCC-A    [facility]  Head and Neck Surgical Oncology  JOSUE Lezama, FNP-C  Pedatric Otolaryngology  JOSUE Garcia, PNP-C       Re:  Socorro Palafox  :  1985    Dear Dr. Dave Tracey,    Thank you for referring your patient, Socorro Palafox, to us for evaluation and treatment.  I have enclosed a copy of my clinic note for your review and records.  If you have any questions please do not hesitate to contact our office.     Thank you for allowing me to participate in the care of your patient.    Sincerely,        Clyde Lee MD, MPH, FACS    Director, Rhinology and Sinus Surgery  Department of Otorhinolaryngology  [facility]    Encl:  Progress note       [facility] [address] [place] [address]  phone [phone]   fax [phone]  [email or web address]

## 2019-11-21 NOTE — Clinical Note
November 21, 2019      Ban Tracey MD  [address]  [address]  [facility] [address]           [facility] - Otorhinolaryngology  [address]  [facility] [address]  Phone: [phone]  Fax: [phone]          Patient: Socorro Palafox   MR Number: [id number]   YOB: 1985   Date of Visit: 11/21/2019       Dear Dr. Ban Tracey:    Thank you for referring Socorro Palafox to me for evaluation. Attached you will find relevant portions of my assessment and plan of care.    If you have questions, please do not hesitate to call me. I look forward to following Socorro Palafox along with you.    Sincerely,    Clyde Lee MD    Enclosure  CC:  No Recipients    If you would like to receive this communication electronically, please contact [email or web address] or [phone] to request more information on [employer] access.    For providers and/or their staff who would like to refer a patient to [facility], please contact us through our one-stop-shop provider referral line, [facility], at [phone].    If you feel you have received this communication in error or would no longer like to receive these types of communications, please e-mail [email or web address]

## 2019-11-21 NOTE — PROCEDURES
Nasal/sinus endoscopy  Date/Time: 11/21/2019 11:00 AM  Performed by: Clyde Lee MD  Authorized by: Clyde Lee MD     Consent Done?:  Yes (Verbal)  Anesthesia:     Local anesthetic:  Lidocaine 4% and Feliz-Synephrine 1/2%    Patient tolerance:  Patient tolerated the procedure well with no immediate complications  Nose:     Procedure Performed:  Nasal Endoscopy  External:      No external nasal deformity  Intranasal:      Mucosa no polyps     Mucosa ulcers not present     No mucosa lesions present     Turbinates not enlarged     No septum gross deformity  Nasopharynx:      No mucosa lesions     Adenoids not present     Posterior choanae patent     Eustachian tube patent     Patent maxillary antrostomies and ethmoidectomies bilaterally.  Left maxillary inclusion cyst, scope retroflexed into lumen.  Right maxillary lumen clear.  No purulence or polyps.

## 2019-11-21 NOTE — PROGRESS NOTES
Subjective:      Socorro Palafox is a 34 y.o. female who was referred to me by Dr. Ban Arteaga* in consultation for sinusitis.    She relates a 2-year history of progressively worsening symptoms including bilateral nasal congestion, worse on the left side, hyposmia, facial pressure, frontal pressure, nasal discharge and aural fullness.  This culminated in sinus surgery and septoplasty by Dr. Raman in June 2019, but noted no improvement even after using antibiotic-steroid rinses for a month afterward.  She was then referred to Dr. Acosta by her PCP, who placed her on additional similar medication.  She remains symptomatic as above without significant relief.    Current sinonasal medications as above.  The last course of antibiotics was a long time ago.  She does not regularly use nasal decongestant sprays.    She recalls previously having allergy testing by Dr. Raman which was reportedly all negative.    She relates a history of asthma which is currently managed with albuterol as needed.    She relates a history of reflux symptoms which is not currently managed with medication.  She has not previously had an EGD.    She denies a diagnosis of obstructive sleep apnea.     She has previously had sinonasal surgery as above.    She does not recall a prior history of nasal trauma other than the sinonasal surgery.    SNOT-22 score = 68, NOSE score = 70%, ETDQ-7 score = 4.6        Past Medical History  She has a past medical history of History of cervical cancer.    Past Surgical History  She has a past surgical history that includes pylori stenosis surgery; Hysterectomy; and Sinus surgery.    Family History  Her family history includes Cancer in her other and paternal aunt.    Social History  She reports that she has never smoked. She has never used smokeless tobacco. She reports that she drinks alcohol. She reports that she does not use drugs.    Allergies  She is allergic to codeine; hydrocodone; oxycontin  "[oxycodone]; and zofran [ondansetron hcl (pf)].    Medications   She has a current medication list which includes the following prescription(s): budesonide, ceftriaxone, and propranolol.    Review of Systems  Review of Systems   Constitutional: Positive for fever. Negative for fatigue and unexpected weight change.   HENT: Positive for congestion, ear discharge, ear pain, hearing loss, nosebleeds, postnasal drip, sinus pressure, sore throat, tinnitus and trouble swallowing. Negative for dental problem, facial swelling, hoarse voice, rhinorrhea and voice change.    Eyes: Positive for discharge and itching. Negative for photophobia and visual disturbance.   Respiratory: Positive for cough and shortness of breath. Negative for apnea and wheezing.    Cardiovascular: Positive for chest pain. Negative for palpitations.   Gastrointestinal: Positive for abdominal pain. Negative for nausea and vomiting.   Endocrine: Negative for cold intolerance and heat intolerance.   Genitourinary: Negative for difficulty urinating.   Musculoskeletal: Positive for arthralgias and back pain. Negative for myalgias and neck pain.   Skin: Negative for rash.   Allergic/Immunologic: Negative for environmental allergies and food allergies.   Neurological: Positive for dizziness, tremors and headaches. Negative for seizures, syncope and weakness.   Hematological: Negative for adenopathy. Does not bruise/bleed easily.   Psychiatric/Behavioral: Positive for decreased concentration and sleep disturbance. Negative for dysphoric mood. The patient is nervous/anxious.           Objective:     /70   Pulse 68   Ht 5' 7" (1.702 m)   Wt 72 kg (158 lb 11.7 oz)   BMI 24.86 kg/m²        Constitutional:   She appears well-developed. She is cooperative. Normal speech.  No hoarse voice.      Head:  Normocephalic. Salivary glands normal.  Facial strength is normal.      Ears:    Right Ear: No drainage or tenderness. Tympanic membrane is not perforated. " Tympanic membrane mobility is normal. No middle ear effusion. No decreased hearing is noted.   Left Ear: No drainage or tenderness. Tympanic membrane is not perforated. Tympanic membrane mobility is normal.  No middle ear effusion. No decreased hearing is noted.     Nose:  No mucosal edema, rhinorrhea, septal deviation or polyps. No epistaxis. Turbinates normal, no turbinate masses and no turbinate hypertrophy.  Right sinus exhibits no maxillary sinus tenderness and no frontal sinus tenderness. Left sinus exhibits maxillary sinus tenderness and frontal sinus tenderness.     Mouth/Throat  Oropharynx clear and moist without lesions or asymmetry and normal uvula midline. She does not have dentures. Normal dentition. No oral lesions or mucous membrane lesions. No oropharyngeal exudate or posterior oropharyngeal erythema. Mirror exam not performed due to patient tolerance.  Mirror exam not performed due to patient tolerance.      Neck:  Neck normal without thyromegaly masses, asymmetry, normal tracheal structure, crepitus, and tenderness, thyroid normal, trachea normal and no adenopathy. Normal range of motion present.     She has no cervical adenopathy.     Cardiovascular:   Regular rhythm.      Pulmonary/Chest:   Effort normal.     Psychiatric:   She has a normal mood and affect. Her speech is normal and behavior is normal.     Neurological:   No cranial nerve deficit.     Skin:   No rash noted.       Procedure    Nasal endoscopy performed.  See procedure note.     Left nasal valve     Left maxillary antrostomy     Left MT     Left maxillary lumen     Left choana     Right nasal valve     Right maxillary antrostomy     Right MT     Right choana        Data Reviewed    WBC (K/uL)   Date Value   11/08/2019 6.16     Eosinophil% (%)   Date Value   11/08/2019 1.9     Eos # (K/uL)   Date Value   11/08/2019 0.1     Platelets (K/uL)   Date Value   11/08/2019 166     Glucose (mg/dL)   Date Value   11/08/2019 90     No results  found for: IGE    I independently reviewed the images of the CT sinuses dated 9/16/19. Pertinent findings include patchy opacification, especially in frontal outflow tracts and anterior ethmoids, with left maxillary cystic antral opacification.       Assessment:     1. Nonallergic rhinitis    2. Other chronic sinusitis    3. Salpingitis of both eustachian tubes    4. Fibromyalgia         Plan:     I had a long discussion with the patient regarding her condition and the further workup and management options.  I reassured her as to the benign nature of today's findings, including the absence of active sinus infection or visible anatomic obstruction.  I advised completing the prescribed compounded rinse and then stopping.  I prescribed the daily use of Flonase Sensimist to treat sinonasal inflammation with a preferential delivery mechanism for the posterior nasal cavity and nasopharynx, particularly at the eustachian tube.    Follow up if symptoms worsen or fail to improve.

## 2020-01-08 ENCOUNTER — OFFICE VISIT (OUTPATIENT)
Dept: OTOLARYNGOLOGY | Facility: CLINIC | Age: 35
End: 2020-01-08
Payer: COMMERCIAL

## 2020-01-08 VITALS
DIASTOLIC BLOOD PRESSURE: 72 MMHG | HEIGHT: 67 IN | TEMPERATURE: 97 F | BODY MASS INDEX: 23.81 KG/M2 | WEIGHT: 151.69 LBS | SYSTOLIC BLOOD PRESSURE: 105 MMHG | HEART RATE: 58 BPM

## 2020-01-08 DIAGNOSIS — J31.0 NONALLERGIC RHINITIS: ICD-10-CM

## 2020-01-08 DIAGNOSIS — J32.8 OTHER CHRONIC SINUSITIS: Primary | ICD-10-CM

## 2020-01-08 DIAGNOSIS — H69.93 ETD (EUSTACHIAN TUBE DYSFUNCTION), BILATERAL: ICD-10-CM

## 2020-01-08 PROCEDURE — 99213 PR OFFICE/OUTPT VISIT, EST, LEVL III, 20-29 MIN: ICD-10-PCS | Mod: 25,S$GLB,, | Performed by: OTOLARYNGOLOGY

## 2020-01-08 PROCEDURE — 31231 NASAL ENDOSCOPY DX: CPT | Mod: S$GLB,,, | Performed by: OTOLARYNGOLOGY

## 2020-01-08 PROCEDURE — 3008F PR BODY MASS INDEX (BMI) DOCUMENTED: ICD-10-PCS | Mod: CPTII,S$GLB,, | Performed by: OTOLARYNGOLOGY

## 2020-01-08 PROCEDURE — 3008F BODY MASS INDEX DOCD: CPT | Mod: CPTII,S$GLB,, | Performed by: OTOLARYNGOLOGY

## 2020-01-08 PROCEDURE — 31231 PR NASAL ENDOSCOPY, DX: ICD-10-PCS | Mod: S$GLB,,, | Performed by: OTOLARYNGOLOGY

## 2020-01-08 PROCEDURE — 99999 PR PBB SHADOW E&M-EST. PATIENT-LVL III: ICD-10-PCS | Mod: PBBFAC,,, | Performed by: OTOLARYNGOLOGY

## 2020-01-08 PROCEDURE — 99213 OFFICE O/P EST LOW 20 MIN: CPT | Mod: 25,S$GLB,, | Performed by: OTOLARYNGOLOGY

## 2020-01-08 PROCEDURE — 99999 PR PBB SHADOW E&M-EST. PATIENT-LVL III: CPT | Mod: PBBFAC,,, | Performed by: OTOLARYNGOLOGY

## 2020-01-08 NOTE — PROCEDURES
Nasal/sinus endoscopy  Date/Time: 1/8/2020 9:40 AM  Performed by: Ban Tracey MD  Authorized by: Ban Tracey MD     Consent Done?:  Yes (Verbal)  Anesthesia:     Local anesthetic:  Topical anesthetic  Nose:     Procedure Performed:  Nasal Endoscopy  External:      No external nasal deformity  Intranasal:      Mucosa no polyps     No mucosa lesions present     Turbinates not enlarged     No septum gross deformity  Nasopharynx:      No mucosa lesions     Adenoids not present     Posterior choanae patent     Eustachian tube patent     Bilateral maxillary antrostomy widely patent; ethmoid cavities patent.  No purulent drainage noted.

## 2020-01-08 NOTE — PROGRESS NOTES
Chief Complaint   Patient presents with    Follow-up     states improvement in sinus since last visit.     Nasal Congestion   .    HPI:     Socorro Palafox is a 34 y.o. female who is referred by Dr. Amanuel Rios MD for evaluation of chronic rhinosinusitis.  She relates that she has had a several year history of chronic rhinosinusitis.  She moved to the oral and area from [place] in January of 2019.  Her sinonasal symptoms worsened and was not controlled by medical management therefore she underwent septoplasty, SMR T, and endoscopic sinus surgery with Dr. Cristina Raman MD in June of 2019. Initially after surgery, Dr. Raman prescribed a 1 month course of compounded rinses Tobramycin and budesonide in July on 2019.  She relates that she had normal allergy testing upon workup with Dr. Raman.    She   presents today for evaluation of a several day history of nasal congestion and postnasal drip.  She does use sinus rinses or nasal sprays. She denies midface pain and pressure.  She admits to rhinorrhea and postnasal drip. There is not maxillary tooth pain. She  admits to headaches.    Most recently she was treated with Levaquin 500mg PO daily for 10 days. She feels this has helped with her symptoms.  She did obtain a CT scan of the sinuses at the initiation of oral antibiotics by Dr. Rios.    Interval HPI 11/12/2019:  Mrs. Ruano follows up today for CRS. She reports that she is doing better. She reports that that she is noticing decreased nasal congestion and post-nasal drip since last visit approximately 2 months ago.  She denies facial pain or pressure.  She notes that she has been using compounded nasal saline rinses as prescribed and this has been helpful.      Interval HPI 01/08/2020:  Mrs. Palafox follows up today for chronic rhinosinusitis, nonallergic rhinitis, and eustachian tube dysfunction.  Since last visit she has been on compound did nasal rinses every other day and Flonase sensimist.  She  relates that since her last visit with me she did see Dr. Lee who did place her on this medication and advised that she complete the compound nasal rinses and then discontinue them as the infection seemed better during that visit.  She relates that her symptoms have been relatively well controlled.  She does feel that she has intermittent nasal congestion.  She has not had significant nasal discharge nor postnasal drip.  She denies facial pain or pressure or headaches.    Past Medical History:   Diagnosis Date    History of cervical cancer      Social History     Socioeconomic History    Marital status:      Spouse name: Not on file    Number of children: Not on file    Years of education: Not on file    Highest education level: Not on file   Occupational History    Not on file   Social Needs    Financial resource strain: Not on file    Food insecurity:     Worry: Not on file     Inability: Not on file    Transportation needs:     Medical: Not on file     Non-medical: Not on file   Tobacco Use    Smoking status: Never Smoker    Smokeless tobacco: Never Used   Substance and Sexual Activity    Alcohol use: Yes     Frequency: 2-4 times a month     Drinks per session: 1 or 2     Binge frequency: Never    Drug use: Never    Sexual activity: Yes     Partners: Male     Birth control/protection: See Surgical Hx, Surgical   Lifestyle    Physical activity:     Days per week: Not on file     Minutes per session: Not on file    Stress: Not on file   Relationships    Social connections:     Talks on phone: Not on file     Gets together: Not on file     Attends [employer]ism service: Not on file     Active member of club or organization: Not on file     Attends meetings of clubs or organizations: Not on file     Relationship status: Not on file   Other Topics Concern    Not on file   Social History Narrative    Not on file     Past Surgical History:   Procedure Laterality Date    HYSTERECTOMY       pylori stenosis surgery      SINUS SURGERY      deviated septum surgery and turbinate reduction     Family History   Problem Relation Age of Onset    Cancer Paternal Aunt         ovarian cancer - 50    Cancer Other         ovarian cancer - 55           Review of Systems  General: negative for chills, fever or weight loss  Psychological: negative for mood changes or depression  Ophthalmic: negative for blurry vision, photophobia or eye pain  ENT: see HPI  Respiratory: no cough, shortness of breath, or wheezing  Cardiovascular: no chest pain or dyspnea on exertion  Gastrointestinal: no abdominal pain, change in bowel habits, or black/ bloody stools  Musculoskeletal: negative for gait disturbance or muscular weakness  Neurological: no syncope or seizures; no ataxia  Dermatological: negative for puritis,  rash and jaundice  Hematologic/lymphatic: no easy bruising, no new lumps or bumps      Physical Exam:    Vitals:    01/08/20 0939   BP: 105/72   Pulse: (!) 58   Temp: 97.4 °F (36.3 °C)       Constitutional: Well appearing / communicating without difficutly.  NAD.  Eyes: EOM I Bilaterally  Head/Face: Normocephalic.  Negative paranasal sinus pressure/tenderness.  Salivary glands WNL.  House Brackmann I Bilaterally.    Right Ear: Auricle normal appearance. External Auditory Canal within normal limits,TM w/o masses/lesions/perforations. TM mobility noted.   Left Ear: Auricle normal appearance. External Auditory Canal WNL,TM w/o masses/lesions/perforations. TM mobility noted.  Nose: No gross nasal septal deviation. Inferior Turbinates 3+ bilaterally. No septal perforation. No masses/lesions. External nasal skin appears normal without masses/lesions.  Oral Cavity: Gingiva/lips within normal limits.  Dentition/gingiva healthy appearing. Mucus membranes moist. Floor of mouth soft, no masses palpated. Oral Tongue mobile. Hard Palate appears normal.    Oropharynx: Base of tongue appears normal. No masses/lesions noted.  Tonsillar fossa/pharyngeal wall without lesions. Posterior oropharynx WNL.  Soft palate without masses. Midline uvula.   Neck/Lymphatic: No LAD I-VI bilaterally.  No thyromegaly.  No masses noted on exam.    Mirror laryngoscopy/nasopharyngoscopy: Active gag reflex.  Unable to perform.    Neuro/Psychiatric: AOx3.  Normal mood and affect.   Cardiovascular: Normal carotid pulses bilaterally, no increasing jugular venous distention noted at cervical region bilaterally.    Respiratory: Normal respiratory effort, no stridor, no retractions noted.      See separate procedure note for nasal endoscopy.       Assessment:    ICD-10-CM ICD-9-CM    1. Other chronic sinusitis J32.8 473.8    2. Nonallergic rhinitis J31.0 472.0    3. ETD (Eustachian tube dysfunction), bilateral H69.83 381.81      The primary encounter diagnosis was Other chronic sinusitis. Diagnoses of Nonallergic rhinitis and ETD (Eustachian tube dysfunction), bilateral were also pertinent to this visit.      Plan:  No orders of the defined types were placed in this encounter.    Obtain records from Dr. Raman.    Discontinue compound nasal irrigations.  Continue Flonase Sensimist as prescribed.    Recommend marc med sinus rinses daily.   Follow-up in 6 months or sooner if symptoms worsen.  Thank you kindly for allowing me to participate in the patient's care.     Ban Tracey MD

## 2020-01-14 ENCOUNTER — OFFICE VISIT (OUTPATIENT)
Dept: OBSTETRICS AND GYNECOLOGY | Facility: CLINIC | Age: 35
End: 2020-01-14
Payer: COMMERCIAL

## 2020-01-14 VITALS
SYSTOLIC BLOOD PRESSURE: 118 MMHG | HEIGHT: 67 IN | BODY MASS INDEX: 25.08 KG/M2 | DIASTOLIC BLOOD PRESSURE: 76 MMHG | WEIGHT: 159.81 LBS

## 2020-01-14 DIAGNOSIS — M79.7 FIBROMYALGIA: ICD-10-CM

## 2020-01-14 DIAGNOSIS — Z01.419 ENCOUNTER FOR GYNECOLOGICAL EXAMINATION WITHOUT ABNORMAL FINDING: Primary | ICD-10-CM

## 2020-01-14 DIAGNOSIS — Z90.710 S/P HYSTERECTOMY: ICD-10-CM

## 2020-01-14 DIAGNOSIS — Z85.41 HISTORY OF CERVICAL CANCER: ICD-10-CM

## 2020-01-14 PROCEDURE — 88175 CYTOPATH C/V AUTO FLUID REDO: CPT

## 2020-01-14 PROCEDURE — 99395 PREV VISIT EST AGE 18-39: CPT | Mod: S$GLB,,, | Performed by: OBSTETRICS & GYNECOLOGY

## 2020-01-14 PROCEDURE — 99395 PR PREVENTIVE VISIT,EST,18-39: ICD-10-PCS | Mod: S$GLB,,, | Performed by: OBSTETRICS & GYNECOLOGY

## 2020-01-14 PROCEDURE — 99999 PR PBB SHADOW E&M-EST. PATIENT-LVL III: ICD-10-PCS | Mod: PBBFAC,,, | Performed by: OBSTETRICS & GYNECOLOGY

## 2020-01-14 PROCEDURE — 99999 PR PBB SHADOW E&M-EST. PATIENT-LVL III: CPT | Mod: PBBFAC,,, | Performed by: OBSTETRICS & GYNECOLOGY

## 2020-01-14 NOTE — PROGRESS NOTES
"CC: Well woman exam    Socorro Palafox is a 34 y.o. female  presents for a well woman exam.  Diagnosed with Cervical cancer .  S/p HYST  Ovaries in situ.  Has some vaginal pain off and on.  Has some vaginal discharge but no concerns today.    Past Medical History:   Diagnosis Date    History of cervical cancer      Past Surgical History:   Procedure Laterality Date    HYSTERECTOMY      pylori stenosis surgery      SINUS SURGERY      deviated septum surgery and turbinate reduction     Family History   Problem Relation Age of Onset    Cancer Paternal Aunt         ovarian cancer - 50    Cancer Other         ovarian cancer - 55     Social History     Tobacco Use    Smoking status: Never Smoker    Smokeless tobacco: Never Used   Substance Use Topics    Alcohol use: Yes     Frequency: 2-4 times a month     Drinks per session: 1 or 2     Binge frequency: Never    Drug use: Never     OB History        6    Para   2    Term   2            AB   4    Living   2       SAB        TAB        Ectopic        Multiple        Live Births   2                 /76   Ht 5' 7" (1.702 m)   Wt 72.5 kg (159 lb 13.3 oz)   LMP  (LMP Unknown)   BMI 25.03 kg/m²     ROS:  GENERAL: Denies weight gain or weight loss. Feeling well overall.   SKIN: Denies rash or lesions.   HEAD: Denies head injury or headache.   NODES: Denies enlarged lymph nodes.   CHEST: Denies chest pain or shortness of breath.   CARDIOVASCULAR: Denies palpitations or left sided chest pain.   ABDOMEN: No abdominal pain, constipation, diarrhea, nausea, vomiting or rectal bleeding.   URINARY: No frequency, dysuria, hematuria, or burning on urination.  REPRODUCTIVE: See HPI.   BREASTS: The patient performs breast self-examination and denies pain, lumps, or nipple discharge.   HEMATOLOGIC: No easy bruisability or excessive bleeding.   MUSCULOSKELETAL: Denies joint pain or swelling.   NEUROLOGIC: Denies syncope or weakness.   PSYCHIATRIC: " Denies depression, anxiety or mood swings.    PE:   APPEARANCE: Well nourished, well developed, in no acute distress.  AFFECT: WNL, alert and oriented x 3.  SKIN: No acne or hirsutism.  NECK: Neck symmetric without masses or thyromegaly.  NODES: No inguinal, cervical, axillary or femoral lymph node enlargement.  CHEST: Good respiratory effort.   ABDOMEN: Soft. No tenderness or masses. No hepatosplenomegaly. No hernias.  BREASTS: Symmetrical, no skin changes or visible lesions. No palpable masses, nipple discharge bilaterally.  PELVIC: Normal external female genitalia without lesions. Normal hair distribution. Adequate perineal body, normal urethral meatus. Vagina atrophic without lesions or discharge. No significant cystocele or rectocele. Bimanual exam shows uterus and cervix to be surgically absent. Adnexa without masses or tenderness.  RECTAL: Rectovaginal exam confirms above with normal sphincter tone, no masses.  EXTREMITIES: No edema.    Diagnosis      ICD-10-CM ICD-9-CM    1. Encounter for gynecological examination without abnormal finding Z01.419 V72.31    2. S/P hysterectomy Z90.710 V88.01    3. History of cervical cancer Z85.41 V10.41 Liquid-Based Pap Smear, Screening   4. Fibromyalgia M79.7 729.1          Patient was counseled today on A.C.S. Pap guidelines and recommendations for yearly pelvic exams, mammograms and monthly self breast exams; to see her PCP for other health maintenance.     Follow up in about 1 year (around 1/14/2021).

## 2020-01-30 LAB
FINAL PATHOLOGIC DIAGNOSIS: NORMAL
Lab: NORMAL

## 2020-04-21 ENCOUNTER — PATIENT MESSAGE (OUTPATIENT)
Dept: OTOLARYNGOLOGY | Facility: CLINIC | Age: 35
End: 2020-04-21

## 2020-04-23 ENCOUNTER — TELEPHONE (OUTPATIENT)
Dept: OTOLARYNGOLOGY | Facility: CLINIC | Age: 35
End: 2020-04-23

## 2020-04-23 NOTE — TELEPHONE ENCOUNTER
Left message for patient that I had to rescheduled her virtual visit to Monday April 27, 2020 at 2:20PM.

## 2020-04-23 NOTE — TELEPHONE ENCOUNTER
Spoke with patient and scheduled her a virtual visit for Wednesday April 29, 2020 at 1:00PM. Explained to patient how the virtual visit works. Patient voice understanding.

## 2020-04-23 NOTE — TELEPHONE ENCOUNTER
----- Message from Greta Baldwin sent at 4/23/2020 10:30 AM CDT -----  Pt is calling to schedule her virtual visit returning the nurse call. Pt can be reached at [phone]    Thank you!

## 2020-04-27 ENCOUNTER — TELEPHONE (OUTPATIENT)
Dept: OTOLARYNGOLOGY | Facility: CLINIC | Age: 35
End: 2020-04-27

## 2020-04-27 ENCOUNTER — OFFICE VISIT (OUTPATIENT)
Dept: OTOLARYNGOLOGY | Facility: CLINIC | Age: 35
End: 2020-04-27
Payer: COMMERCIAL

## 2020-04-27 DIAGNOSIS — H69.93 ETD (EUSTACHIAN TUBE DYSFUNCTION), BILATERAL: ICD-10-CM

## 2020-04-27 DIAGNOSIS — J32.8 OTHER CHRONIC SINUSITIS: Primary | ICD-10-CM

## 2020-04-27 DIAGNOSIS — J31.0 NONALLERGIC RHINITIS: ICD-10-CM

## 2020-04-27 PROCEDURE — 99213 OFFICE O/P EST LOW 20 MIN: CPT | Mod: 95,,, | Performed by: OTOLARYNGOLOGY

## 2020-04-27 PROCEDURE — 99213 PR OFFICE/OUTPT VISIT, EST, LEVL III, 20-29 MIN: ICD-10-PCS | Mod: 95,,, | Performed by: OTOLARYNGOLOGY

## 2020-04-27 RX ORDER — AMOXICILLIN AND CLAVULANATE POTASSIUM 875; 125 MG/1; MG/1
1 TABLET, FILM COATED ORAL 2 TIMES DAILY
Qty: 20 TABLET | Refills: 0 | Status: SHIPPED | OUTPATIENT
Start: 2020-04-27 | End: 2020-05-07

## 2020-04-27 NOTE — TELEPHONE ENCOUNTER
----- Message from Ban Tracey MD sent at 4/27/2020  3:02 PM CDT -----  3 month follow up(possible rigid)

## 2020-04-27 NOTE — TELEPHONE ENCOUNTER
Spoke with patient and schedule a follow up on Monday 07/27/2020 at 9:20AM. Patient voice understanding.

## 2020-04-27 NOTE — PROGRESS NOTES
"Otolaryngology Telemedicine Note    Socorro Palafox  Encounter Date: 4/27/2020   YOB: 1985  Referring Physician: No referring provider defined for this encounter.   PCP: Amanuel Rios MD    This is a telehealth visit that was performed at [facility]. Verbal consent to participate in video visit was obtained. This particular visit occurred during the 2020 COVID19 outbreak.     Each patient to whom he or she provides medical services by telemedicine is:  (1) informed of the relationship between the physician and patient and the respective role of any other health care provider with respect to management of the patient; and (2) notified that he or she may decline to receive medical services by telemedicine and may withdraw from such care at any time.  I discussed with the patient that:  - I would evaluate the patient and recommend diagnostics and treatments based on my assessment  - Our sessions are not being recorded and that personal health information is protected  - Our team would provide follow up care in person if/when the patient needs it     The patient location is: [place], [place]  The chief complaint leading to consultation is: "sinus drip"  Visit type: Virtual visit with synchronous audio and video  Total time spent with patient: 15 minutes    HPI: Socorro Palafox is a 34 y.o. female who reports 3 week history of post-nasal drip, nasal congestion, and sore throat.  She reports that the discharge has been thick and foul smelling.  She notes frontal pressure and headaches around her eyes.  She has had some left intermittent pressure. She reports decreased sense of smell and taste which has been present since her surgery. She has been using nasal saline rinses BID and Flonase sensimist without relief.  She has had bilateral aural fullness.     Review of Systems   Constitutional: Negative for chills and fever.   Eyes: Negative for blurred vision and double vision.   Respiratory: Negative for cough, " hemoptysis and shortness of breath.    Cardiovascular: Negative for chest pain and palpitations.   Gastrointestinal: Negative for heartburn and nausea.   Skin: Negative for itching and rash.   Neurological: Negative for dizziness and seizures.   Endo/Heme/Allergies: Negative for environmental allergies. Does not bruise/bleed easily.   Psychiatric/Behavioral: Negative for depression and memory loss.        Review of patient's allergies indicates:   Allergen Reactions    Codeine Shortness Of Breath    Hydrocodone Shortness Of Breath    Oxycontin [oxycodone] Shortness Of Breath    Zofran [ondansetron hcl (pf)] Shortness Of Breath       Past Medical History:   Diagnosis Date    History of cervical cancer        Past Surgical History:   Procedure Laterality Date    HYSTERECTOMY      pylori stenosis surgery      SINUS SURGERY      deviated septum surgery and turbinate reduction       Social History     Socioeconomic History    Marital status:      Spouse name: Not on file    Number of children: Not on file    Years of education: Not on file    Highest education level: Not on file   Occupational History    Not on file   Social Needs    Financial resource strain: Not on file    Food insecurity:     Worry: Not on file     Inability: Not on file    Transportation needs:     Medical: Not on file     Non-medical: Not on file   Tobacco Use    Smoking status: Never Smoker    Smokeless tobacco: Never Used   Substance and Sexual Activity    Alcohol use: Yes     Frequency: 2-4 times a month     Drinks per session: 1 or 2     Binge frequency: Never    Drug use: Never    Sexual activity: Yes     Partners: Male     Birth control/protection: See Surgical Hx, Surgical   Lifestyle    Physical activity:     Days per week: Not on file     Minutes per session: Not on file    Stress: Not on file   Relationships    Social connections:     Talks on phone: Not on file     Gets together: Not on file     Attends  Buddhism service: Not on file     Active member of club or organization: Not on file     Attends meetings of clubs or organizations: Not on file     Relationship status: Not on file   Other Topics Concern    Not on file   Social History Narrative    Not on file       Family History   Problem Relation Age of Onset    Cancer Paternal Aunt         ovarian cancer - 50    Cancer Other         ovarian cancer - 55       Outpatient Encounter Medications as of 4/27/2020   Medication Sig Dispense Refill    amoxicillin-clavulanate 875-125mg (AUGMENTIN) 875-125 mg per tablet Take 1 tablet by mouth 2 (two) times daily. for 10 days 20 tablet 0    budesonide (PULMICORT) 0.5 mg/2 mL nebulizer solution EMPTY CONTENTS OF 1 RESPULE INTO NASAL IRRIGATION SYSTEM, ADD DISTILLED WATER, SALT PACK, MIX & IRRIGATE. PERFORM TWICE DAILY  1    propranolol (INDERAL) 20 MG tablet       [DISCONTINUED] cefTRIAXone (ROCEPHIN) 500 mg injection EMPTY CONTENTS OF 1 VIAL INTO NASAL IRRIGATION SYSTEM, ADD DISTILLED WATER, SALT PACK, MIX & IRRIGATE PERFORM 2 TIMES DAILY. (2/DAY)  1     No facility-administered encounter medications on file as of 4/27/2020.        Physical Exam:  There were no vitals filed for this visit.    Constitutional  General Appearance: well nourished, well-developed, alert, oriented, in no acute distress  Communication: ability, understanding, voice quality normal  Head and Face  Inspection: normocephalic, atraumatic, no scars, lesions or masses    Eyes  Ocular Motility / Alignment: normal alignment, motility, no proptosis  Conjunctiva: not injected  Eyelids: no entropion or ectropion, no edema  Ears  Hearing: speech reception thresholds grossly normal  External Ears: no auricle lesions, no apparent edema or erythema of tragus/lateral aspect of canal    Nose  External Nose: no lesions, trauma or deformity  Oral Cavity / Oropharynx  Lips: upper and lower lips pink and moist  Tongue: normal mobility, no obvious  lesions  Oropharynx: 1+ , no obvious mass or lesion, no erythema  Neck  Inspection: no erythema, no tenderness per patient report on palpation of self, no obvious large masses visible  Chest / Respiratory  Chest: no stridor or retractions, normal effort and expansion  Neurological  Cranial Nerves: grossly intact  General: alert and oriented  Psychiatric  Mood and Affect: no depression, anxiety or agitation        Assessment and Plan:  Socorro Palafox is a 34 y.o. female with     Other chronic sinusitis    Nonallergic rhinitis    ETD (Eustachian tube dysfunction), bilateral    Other orders  -     amoxicillin-clavulanate 875-125mg (AUGMENTIN) 875-125 mg per tablet; Take 1 tablet by mouth 2 (two) times daily. for 10 days  Dispense: 20 tablet; Refill: 0         Start Augmentin 875mg PO BID for 10 days  Continue nasal saline rinses BID  Continue flonase sensimist.   Follow up in 3 months.     Marcie Tauzin Wilkinson, MD [facility]

## 2020-05-06 ENCOUNTER — PATIENT MESSAGE (OUTPATIENT)
Dept: OTOLARYNGOLOGY | Facility: CLINIC | Age: 35
End: 2020-05-06

## 2020-05-11 ENCOUNTER — PATIENT MESSAGE (OUTPATIENT)
Dept: INTERNAL MEDICINE | Facility: CLINIC | Age: 35
End: 2020-05-11

## 2020-05-11 ENCOUNTER — PATIENT MESSAGE (OUTPATIENT)
Dept: OTOLARYNGOLOGY | Facility: CLINIC | Age: 35
End: 2020-05-11

## 2020-05-12 RX ORDER — PROPRANOLOL HYDROCHLORIDE 20 MG/1
20 TABLET ORAL 3 TIMES DAILY
Qty: 90 TABLET | Refills: 2 | Status: SHIPPED | OUTPATIENT
Start: 2020-05-12 | End: 2020-08-16

## 2020-05-19 ENCOUNTER — PATIENT MESSAGE (OUTPATIENT)
Dept: OTOLARYNGOLOGY | Facility: CLINIC | Age: 35
End: 2020-05-19

## 2020-06-02 ENCOUNTER — OFFICE VISIT (OUTPATIENT)
Dept: OTOLARYNGOLOGY | Facility: CLINIC | Age: 35
End: 2020-06-02
Payer: COMMERCIAL

## 2020-06-02 DIAGNOSIS — J31.0 NONALLERGIC RHINITIS: ICD-10-CM

## 2020-06-02 DIAGNOSIS — H69.93 ETD (EUSTACHIAN TUBE DYSFUNCTION), BILATERAL: ICD-10-CM

## 2020-06-02 DIAGNOSIS — J32.8 OTHER CHRONIC SINUSITIS: Primary | ICD-10-CM

## 2020-06-02 PROCEDURE — 99214 OFFICE O/P EST MOD 30 MIN: CPT | Mod: 95,,, | Performed by: OTOLARYNGOLOGY

## 2020-06-02 PROCEDURE — 99214 PR OFFICE/OUTPT VISIT, EST, LEVL IV, 30-39 MIN: ICD-10-PCS | Mod: 95,,, | Performed by: OTOLARYNGOLOGY

## 2020-06-02 RX ORDER — CEFDINIR 300 MG/1
300 CAPSULE ORAL 2 TIMES DAILY
Qty: 14 CAPSULE | Refills: 0 | Status: SHIPPED | OUTPATIENT
Start: 2020-06-02 | End: 2020-06-09

## 2020-06-02 RX ORDER — LEVOCETIRIZINE DIHYDROCHLORIDE 5 MG/1
5 TABLET, FILM COATED ORAL NIGHTLY
Qty: 30 TABLET | Refills: 3 | Status: SHIPPED | OUTPATIENT
Start: 2020-06-02 | End: 2020-11-30 | Stop reason: SDUPTHER

## 2020-06-02 NOTE — PROGRESS NOTES
"Otolaryngology Telemedicine Note    Socorro Palafox  Encounter Date: 6/2/2020   YOB: 1985  Referring Physician: Aaareferral Self  No address on file   PCP: Amanuel Rios MD    This is a telehealth visit that was performed at [facility]. Verbal consent to participate in video visit was obtained. This particular visit occurred during the 2020 COVID19 outbreak.     Each patient to whom he or she provides medical services by telemedicine is:  (1) informed of the relationship between the physician and patient and the respective role of any other health care provider with respect to management of the patient; and (2) notified that he or she may decline to receive medical services by telemedicine and may withdraw from such care at any time.  I discussed with the patient that:  - I would evaluate the patient and recommend diagnostics and treatments based on my assessment  - Our sessions are not being recorded and that personal health information is protected  - Our team would provide follow up care in person if/when the patient needs it     The patient location is: [place], [place]  The chief complaint leading to consultation is: "sinus drip"  Visit type: Virtual visit with synchronous audio and video  Total time spent with patient: 26 minutes    HPI: Socorro Palafox is a 34 y.o. female who reports 3 week history of post-nasal drip, nasal congestion, and sore throat.  She reports that the discharge has been thick and foul smelling.  She notes frontal pressure and headaches around her eyes.  She has had some left intermittent pressure. She reports decreased sense of smell and taste which has been present since her surgery. She has been using nasal saline rinses BID and Flonase sensimist without relief.  She has had bilateral aural fullness.     Interval HPI 6/2/2020:  Follow up chronic sinusitis.  She reports that she is having continued post-nasal drip, rhinorrhea, and bilateral aural fullness.  She has had " facial pressure behind the eyes.  She notes muffled hearing.  She is using Flonase sensamist and nasal saline rinses BID. She does not feel that that has helped.   She feels that she had some relief of her symptoms after Augmentin prescribed it seemed to help initially but then the symptoms have since returned.     Review of Systems   Constitutional: Negative for chills and fever.   Eyes: Negative for blurred vision and double vision.   Respiratory: Negative for cough, hemoptysis and shortness of breath.    Cardiovascular: Negative for chest pain and palpitations.   Gastrointestinal: Negative for heartburn and nausea.   Skin: Negative for itching and rash.   Neurological: Negative for dizziness and seizures.   Endo/Heme/Allergies: Negative for environmental allergies. Does not bruise/bleed easily.   Psychiatric/Behavioral: Negative for depression and memory loss.        Review of patient's allergies indicates:   Allergen Reactions    Codeine Shortness Of Breath    Hydrocodone Shortness Of Breath    Oxycontin [oxycodone] Shortness Of Breath    Zofran [ondansetron hcl (pf)] Shortness Of Breath       Past Medical History:   Diagnosis Date    History of cervical cancer        Past Surgical History:   Procedure Laterality Date    HYSTERECTOMY      pylori stenosis surgery      SINUS SURGERY      deviated septum surgery and turbinate reduction       Social History     Socioeconomic History    Marital status:      Spouse name: Not on file    Number of children: Not on file    Years of education: Not on file    Highest education level: Not on file   Occupational History    Not on file   Social Needs    Financial resource strain: Not on file    Food insecurity:     Worry: Not on file     Inability: Not on file    Transportation needs:     Medical: Not on file     Non-medical: Not on file   Tobacco Use    Smoking status: Never Smoker    Smokeless tobacco: Never Used   Substance and Sexual Activity     Alcohol use: Yes     Frequency: 2-4 times a month     Drinks per session: 1 or 2     Binge frequency: Never    Drug use: Never    Sexual activity: Yes     Partners: Male     Birth control/protection: See Surgical Hx, Surgical   Lifestyle    Physical activity:     Days per week: Not on file     Minutes per session: Not on file    Stress: Not on file   Relationships    Social connections:     Talks on phone: Not on file     Gets together: Not on file     Attends Pentecostalism service: Not on file     Active member of club or organization: Not on file     Attends meetings of clubs or organizations: Not on file     Relationship status: Not on file   Other Topics Concern    Not on file   Social History Narrative    Not on file       Family History   Problem Relation Age of Onset    Cancer Paternal Aunt         ovarian cancer - 50    Cancer Other         ovarian cancer - 55       Outpatient Encounter Medications as of 6/2/2020   Medication Sig Dispense Refill    budesonide (PULMICORT) 0.5 mg/2 mL nebulizer solution EMPTY CONTENTS OF 1 RESPULE INTO NASAL IRRIGATION SYSTEM, ADD DISTILLED WATER, SALT PACK, MIX & IRRIGATE. PERFORM TWICE DAILY  1    propranoloL (INDERAL) 20 MG tablet Take 1 tablet (20 mg total) by mouth 3 (three) times daily. 90 tablet 2     No facility-administered encounter medications on file as of 6/2/2020.        Physical Exam:  There were no vitals filed for this visit.    Constitutional  General Appearance: well nourished, well-developed, alert, oriented, in no acute distress  Communication: ability, understanding, voice quality normal  Head and Face  Inspection: normocephalic, atraumatic, no scars, lesions or masses    Eyes  Ocular Motility / Alignment: normal alignment, motility, no proptosis  Conjunctiva: not injected  Eyelids: no entropion or ectropion, no edema  Ears  Hearing: speech reception thresholds grossly normal  External Ears: no auricle lesions, no apparent edema or erythema of  tragus/lateral aspect of canal    Nose  External Nose: no lesions, trauma or deformity  Oral Cavity / Oropharynx  Lips: upper and lower lips pink and moist  Tongue: normal mobility, no obvious lesions  Oropharynx: 1+ , no obvious mass or lesion, no erythema  Neck  Inspection: no erythema, no tenderness per patient report on palpation of self, no obvious large masses visible  Chest / Respiratory  Chest: no stridor or retractions, normal effort and expansion  Neurological  Cranial Nerves: grossly intact  General: alert and oriented  Psychiatric  Mood and Affect: no depression, anxiety or agitation        Assessment and Plan:  Socorro Palafox is a 34 y.o. female with     Other chronic sinusitis    Nonallergic rhinitis    ETD (Eustachian tube dysfunction), bilateral       Obtain records from Dr. Cristina Raman.   Start Omnicef 300mg PO BID  Continue nasal saline rinses BID  Continue flonase sensimist.   Follow up as previously scheduled.       Marcie Tauzin Wilkinson, MD [facility]

## 2020-06-19 ENCOUNTER — TELEPHONE (OUTPATIENT)
Dept: OTOLARYNGOLOGY | Facility: CLINIC | Age: 35
End: 2020-06-19

## 2020-06-19 ENCOUNTER — PATIENT MESSAGE (OUTPATIENT)
Dept: OTOLARYNGOLOGY | Facility: CLINIC | Age: 35
End: 2020-06-19

## 2020-06-19 NOTE — TELEPHONE ENCOUNTER
Spoke with pt. Scheduled a covid test for 06/21 at 9:30 and an appointment with Dr. Acosta for 06/24 at 9:20. Pt verbalzied understanding.

## 2020-06-21 ENCOUNTER — CLINICAL SUPPORT (OUTPATIENT)
Dept: URGENT CARE | Facility: CLINIC | Age: 35
End: 2020-06-21
Payer: COMMERCIAL

## 2020-06-21 DIAGNOSIS — Z11.59 ENCOUNTER FOR SCREENING FOR OTHER VIRAL DISEASES: Primary | ICD-10-CM

## 2020-06-21 PROCEDURE — U0003 INFECTIOUS AGENT DETECTION BY NUCLEIC ACID (DNA OR RNA); SEVERE ACUTE RESPIRATORY SYNDROME CORONAVIRUS 2 (SARS-COV-2) (CORONAVIRUS DISEASE [COVID-19]), AMPLIFIED PROBE TECHNIQUE, MAKING USE OF HIGH THROUGHPUT TECHNOLOGIES AS DESCRIBED BY CMS-2020-01-R: HCPCS

## 2020-06-23 LAB — SARS-COV-2 RNA RESP QL NAA+PROBE: NOT DETECTED

## 2020-06-24 ENCOUNTER — TELEPHONE (OUTPATIENT)
Dept: OTOLARYNGOLOGY | Facility: CLINIC | Age: 35
End: 2020-06-24

## 2020-06-24 ENCOUNTER — OFFICE VISIT (OUTPATIENT)
Dept: OTOLARYNGOLOGY | Facility: CLINIC | Age: 35
End: 2020-06-24
Payer: COMMERCIAL

## 2020-06-24 VITALS
BODY MASS INDEX: 24.12 KG/M2 | HEART RATE: 91 BPM | WEIGHT: 153.69 LBS | TEMPERATURE: 98 F | HEIGHT: 67 IN | DIASTOLIC BLOOD PRESSURE: 78 MMHG | SYSTOLIC BLOOD PRESSURE: 106 MMHG

## 2020-06-24 DIAGNOSIS — Z01.812 PRE-PROCEDURE LAB EXAM: ICD-10-CM

## 2020-06-24 DIAGNOSIS — J31.0 NONALLERGIC RHINITIS: ICD-10-CM

## 2020-06-24 DIAGNOSIS — H69.93 ETD (EUSTACHIAN TUBE DYSFUNCTION), BILATERAL: ICD-10-CM

## 2020-06-24 DIAGNOSIS — J32.8 OTHER CHRONIC SINUSITIS: Primary | ICD-10-CM

## 2020-06-24 PROCEDURE — 3008F PR BODY MASS INDEX (BMI) DOCUMENTED: ICD-10-PCS | Mod: CPTII,S$GLB,, | Performed by: OTOLARYNGOLOGY

## 2020-06-24 PROCEDURE — 99214 PR OFFICE/OUTPT VISIT, EST, LEVL IV, 30-39 MIN: ICD-10-PCS | Mod: 25,S$GLB,, | Performed by: OTOLARYNGOLOGY

## 2020-06-24 PROCEDURE — 99999 PR PBB SHADOW E&M-EST. PATIENT-LVL III: ICD-10-PCS | Mod: PBBFAC,,, | Performed by: OTOLARYNGOLOGY

## 2020-06-24 PROCEDURE — 3008F BODY MASS INDEX DOCD: CPT | Mod: CPTII,S$GLB,, | Performed by: OTOLARYNGOLOGY

## 2020-06-24 PROCEDURE — 87070 CULTURE OTHR SPECIMN AEROBIC: CPT

## 2020-06-24 PROCEDURE — 99214 OFFICE O/P EST MOD 30 MIN: CPT | Mod: 25,S$GLB,, | Performed by: OTOLARYNGOLOGY

## 2020-06-24 PROCEDURE — 31231 PR NASAL ENDOSCOPY, DX: ICD-10-PCS | Mod: S$GLB,,, | Performed by: OTOLARYNGOLOGY

## 2020-06-24 PROCEDURE — 99999 PR PBB SHADOW E&M-EST. PATIENT-LVL III: CPT | Mod: PBBFAC,,, | Performed by: OTOLARYNGOLOGY

## 2020-06-24 PROCEDURE — 31231 NASAL ENDOSCOPY DX: CPT | Mod: S$GLB,,, | Performed by: OTOLARYNGOLOGY

## 2020-06-24 NOTE — PROGRESS NOTES
Otolaryngology Clinic  Note    Socorro Palafox  Encounter Date: 6/24/2020   YOB: 1985  Referring Physician: Aaareferral Self  No address on file   PCP: Amanuel Rios MD    HPI: Socorro Palafox is a 34 y.o. female who reports 3 week history of post-nasal drip, nasal congestion, and sore throat.  She reports that the discharge has been thick and foul smelling.  She notes frontal pressure and headaches around her eyes.  She has had some left intermittent pressure. She reports decreased sense of smell and taste which has been present since her surgery. She has been using nasal saline rinses BID and Flonase sensimist without relief.  She has had bilateral aural fullness.     Interval HPI 6/25/2020:  Follow up chronic sinusitis. Reports left sided facial pain and pressure.  She reports that she is having continued post-nasal drip, rhinorrhea, and bilateral aural fullness. She completed omnicef and did not feel it helped.   She is using Flonase sensamist and nasal saline rinses BID. She does not feel it is relieving her symptoms.     Review of Systems   Constitutional: Negative for chills and fever.   Eyes: Negative for blurred vision and double vision.   Respiratory: Negative for cough, hemoptysis and shortness of breath.    Cardiovascular: Negative for chest pain and palpitations.   Gastrointestinal: Negative for heartburn and nausea.   Skin: Negative for itching and rash.   Neurological: Negative for dizziness and seizures.   Endo/Heme/Allergies: Negative for environmental allergies. Does not bruise/bleed easily.   Psychiatric/Behavioral: Negative for depression and memory loss.        Review of patient's allergies indicates:   Allergen Reactions    Codeine Shortness Of Breath    Hydrocodone Shortness Of Breath    Oxycontin [oxycodone] Shortness Of Breath    Zofran [ondansetron hcl (pf)] Shortness Of Breath       Past Medical History:   Diagnosis Date    History of cervical cancer        Past  Surgical History:   Procedure Laterality Date    HYSTERECTOMY      pylori stenosis surgery      SINUS SURGERY      deviated septum surgery and turbinate reduction       Social History     Socioeconomic History    Marital status:      Spouse name: Not on file    Number of children: Not on file    Years of education: Not on file    Highest education level: Not on file   Occupational History    Not on file   Social Needs    Financial resource strain: Not on file    Food insecurity     Worry: Not on file     Inability: Not on file    Transportation needs     Medical: Not on file     Non-medical: Not on file   Tobacco Use    Smoking status: Never Smoker    Smokeless tobacco: Never Used   Substance and Sexual Activity    Alcohol use: Yes     Frequency: 2-4 times a month     Drinks per session: 1 or 2     Binge frequency: Never    Drug use: Never    Sexual activity: Yes     Partners: Male     Birth control/protection: See Surgical Hx, Surgical   Lifestyle    Physical activity     Days per week: Not on file     Minutes per session: Not on file    Stress: Not on file   Relationships    Social connections     Talks on phone: Not on file     Gets together: Not on file     Attends Jew service: Not on file     Active member of club or organization: Not on file     Attends meetings of clubs or organizations: Not on file     Relationship status: Not on file   Other Topics Concern    Not on file   Social History Narrative    Not on file       Family History   Problem Relation Age of Onset    Cancer Paternal Aunt         ovarian cancer - 50    Cancer Other         ovarian cancer - 55       Outpatient Encounter Medications as of 6/24/2020   Medication Sig Dispense Refill    levocetirizine (XYZAL) 5 MG tablet Take 1 tablet (5 mg total) by mouth every evening. 30 tablet 3    propranoloL (INDERAL) 20 MG tablet Take 1 tablet (20 mg total) by mouth 3 (three) times daily. 90 tablet 2    budesonide  (PULMICORT) 0.5 mg/2 mL nebulizer solution EMPTY CONTENTS OF 1 RESPULE INTO NASAL IRRIGATION SYSTEM, ADD DISTILLED WATER, SALT PACK, MIX & IRRIGATE. PERFORM TWICE DAILY  1     No facility-administered encounter medications on file as of 6/24/2020.        Physical Exam     Vitals:    06/24/20 0936   BP: 106/78   Pulse: 91   Temp: 98.2 °F (36.8 °C)       Constitutional: Well appearing / communicating.  NAD.  Eyes: EOM I Bilaterally  Head/Face: Normocephalic.  Negative paranasal sinus pressure/tenderness.  Salivary glands WNL.  House Brackmann I Bilaterally.    Right Ear: External Auditory Canal WNL,TM w/o masses/lesions/perforations.  Auricle WNL.  Left Ear: External Auditory Canal WNL,TM w/o masses/lesions/perforations. Auricle WNL.  Nose: No gross nasal septal deviation. Inferior Turbinates 3+ bilaterally. No septal perforation. No masses/lesions. External nasal skin without masses/lesions.  Oral Cavity: Gingiva/lips WNL.  FOM Soft, no masses palpated. Oral Tongue mobile. Hard Palate WNL.   Oropharynx: BOT WNL. No masses/lesions noted. Tonsillar fossa/pharyngeal wall without lesions. Posterior oropharynx WNL.  Soft palate without masses. Midline uvula.   Neck/Lymphatic: No LAD I-VI bilaterally.  No thyromegaly.  No masses noted on exam.    Mirror laryngoscopy/nasopharyngoscopy: Active gag reflex.  Unable to perform.    Neuro/Psychiatric: AOx3.  Normal mood and affect.   Cardiovascular: Normal carotid pulses bilaterally, no increasing jugular venous distention noted at cervical region bilaterally.    Respiratory: Normal respiratory effort, no stridor, no retractions noted.    See separate procedure note for nasal endoscopy.       Assessment and Plan:  Socorro Palafox is a 34 y.o. female with     Other chronic sinusitis  -     Aerobic culture    Nonallergic rhinitis    ETD (Eustachian tube dysfunction), bilateral       Culture obtained.   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  She was counseled on the off-label nature of this therapy and she consented to its use.    Continue nasal saline rinses BID  Continue flonase sensimist.   Follow up as previously scheduled.     MD Michael Villagomezsramu [facility]

## 2020-06-25 NOTE — PROCEDURES
Procedures     PROCEDURE NOTE:  Nasal endoscopy   Preprocedure diagnosis:  Chronic sinusitis  Postprocedure diangosis:  Same  Complications:  None  Blood Loss:  None    Procedure in detail:  After verbal consent was obtained, the patient's nasal cavity was anesthesized using topical 1%lidocaine and Neosynepherine.  A rigid 0 degree endoscope was placed in first the right, then the left nasal cavity.  The inferior and middle turbinates were examined, and found to be normal bilaterally.  The middle meatus and maxillary antrum was also examined, and found to be normal bilaterally.  Thick purulent drainage and crusting in the posterior OP. No masses seen.  The patient tolerated the procedure well and there were no complications.

## 2020-06-26 LAB — BACTERIA SPEC AEROBE CULT: NORMAL

## 2020-07-24 ENCOUNTER — TELEPHONE (OUTPATIENT)
Dept: OTOLARYNGOLOGY | Facility: CLINIC | Age: 35
End: 2020-07-24

## 2020-07-28 ENCOUNTER — LAB VISIT (OUTPATIENT)
Dept: URGENT CARE | Facility: CLINIC | Age: 35
End: 2020-07-28
Payer: COMMERCIAL

## 2020-07-28 VITALS — TEMPERATURE: 98 F

## 2020-07-28 DIAGNOSIS — Z01.812 PRE-PROCEDURE LAB EXAM: ICD-10-CM

## 2020-07-28 PROCEDURE — U0003 INFECTIOUS AGENT DETECTION BY NUCLEIC ACID (DNA OR RNA); SEVERE ACUTE RESPIRATORY SYNDROME CORONAVIRUS 2 (SARS-COV-2) (CORONAVIRUS DISEASE [COVID-19]), AMPLIFIED PROBE TECHNIQUE, MAKING USE OF HIGH THROUGHPUT TECHNOLOGIES AS DESCRIBED BY CMS-2020-01-R: HCPCS

## 2020-07-29 LAB — SARS-COV-2 RNA RESP QL NAA+PROBE: NOT DETECTED

## 2020-07-31 ENCOUNTER — TELEPHONE (OUTPATIENT)
Dept: OTOLARYNGOLOGY | Facility: CLINIC | Age: 35
End: 2020-07-31

## 2020-07-31 ENCOUNTER — OFFICE VISIT (OUTPATIENT)
Dept: OTOLARYNGOLOGY | Facility: CLINIC | Age: 35
End: 2020-07-31
Payer: COMMERCIAL

## 2020-07-31 VITALS
SYSTOLIC BLOOD PRESSURE: 110 MMHG | BODY MASS INDEX: 23.38 KG/M2 | WEIGHT: 149.25 LBS | HEART RATE: 78 BPM | DIASTOLIC BLOOD PRESSURE: 76 MMHG

## 2020-07-31 DIAGNOSIS — H69.93 ETD (EUSTACHIAN TUBE DYSFUNCTION), BILATERAL: ICD-10-CM

## 2020-07-31 DIAGNOSIS — Z01.812 PRE-PROCEDURE LAB EXAM: ICD-10-CM

## 2020-07-31 DIAGNOSIS — J31.0 NONALLERGIC RHINITIS: ICD-10-CM

## 2020-07-31 DIAGNOSIS — J32.8 OTHER CHRONIC SINUSITIS: Primary | ICD-10-CM

## 2020-07-31 PROCEDURE — 99999 PR PBB SHADOW E&M-EST. PATIENT-LVL III: ICD-10-PCS | Mod: PBBFAC,,, | Performed by: OTOLARYNGOLOGY

## 2020-07-31 PROCEDURE — 3008F BODY MASS INDEX DOCD: CPT | Mod: CPTII,S$GLB,, | Performed by: OTOLARYNGOLOGY

## 2020-07-31 PROCEDURE — 3008F PR BODY MASS INDEX (BMI) DOCUMENTED: ICD-10-PCS | Mod: CPTII,S$GLB,, | Performed by: OTOLARYNGOLOGY

## 2020-07-31 PROCEDURE — 99213 PR OFFICE/OUTPT VISIT, EST, LEVL III, 20-29 MIN: ICD-10-PCS | Mod: 25,S$GLB,, | Performed by: OTOLARYNGOLOGY

## 2020-07-31 PROCEDURE — 99213 OFFICE O/P EST LOW 20 MIN: CPT | Mod: 25,S$GLB,, | Performed by: OTOLARYNGOLOGY

## 2020-07-31 PROCEDURE — 99999 PR PBB SHADOW E&M-EST. PATIENT-LVL III: CPT | Mod: PBBFAC,,, | Performed by: OTOLARYNGOLOGY

## 2020-07-31 PROCEDURE — 31231 PR NASAL ENDOSCOPY, DX: ICD-10-PCS | Mod: S$GLB,,, | Performed by: OTOLARYNGOLOGY

## 2020-07-31 PROCEDURE — 31231 NASAL ENDOSCOPY DX: CPT | Mod: S$GLB,,, | Performed by: OTOLARYNGOLOGY

## 2020-07-31 RX ORDER — MUPIROCIN 20 MG/G
OINTMENT TOPICAL
COMMUNITY
Start: 2020-06-26 | End: 2020-12-21 | Stop reason: ALTCHOICE

## 2020-07-31 NOTE — PROGRESS NOTES
Otolaryngology Clinic  Note    Socorro Palafox  Encounter Date: 7/31/2020   YOB: 1985  Referring Physician: Aaareferral Self  No address on file   PCP: Amanuel Rios MD    HPI: Socorro Palafox is a 34 y.o. female who reports 3 week history of post-nasal drip, nasal congestion, and sore throat.  She reports that the discharge has been thick and foul smelling.  She notes frontal pressure and headaches around her eyes.  She has had some left intermittent pressure. She reports decreased sense of smell and taste which has been present since her surgery. She has been using nasal saline rinses BID and Flonase sensimist without relief.  She has had bilateral aural fullness.     Interval HPI 7/31/2020:  Follow up chronic sinusitis. States she is doing better since her last visit 4 weeks ago.  She relays that she has been using the compounded nasal rinses daily and finds that this is extremely beneficial. She reports improvement in facial pressure, post-nasal drip, rhinorrhea, and bilateral aural fullness.      Review of Systems   Constitutional: Negative for chills and fever.   Eyes: Negative for blurred vision and double vision.   Respiratory: Negative for cough, hemoptysis and shortness of breath.    Cardiovascular: Negative for chest pain and palpitations.   Gastrointestinal: Negative for heartburn and nausea.   Skin: Negative for itching and rash.   Neurological: Negative for dizziness and seizures.   Endo/Heme/Allergies: Negative for environmental allergies. Does not bruise/bleed easily.   Psychiatric/Behavioral: Negative for depression and memory loss.        Review of patient's allergies indicates:   Allergen Reactions    Codeine Shortness Of Breath    Hydrocodone Shortness Of Breath    Oxycontin [oxycodone] Shortness Of Breath    Zofran [ondansetron hcl (pf)] Shortness Of Breath       Past Medical History:   Diagnosis Date    History of cervical cancer        Past Surgical History:   Procedure  Laterality Date    HYSTERECTOMY      pylori stenosis surgery      SINUS SURGERY      deviated septum surgery and turbinate reduction       Social History     Socioeconomic History    Marital status:      Spouse name: Not on file    Number of children: Not on file    Years of education: Not on file    Highest education level: Not on file   Occupational History    Not on file   Social Needs    Financial resource strain: Not on file    Food insecurity     Worry: Not on file     Inability: Not on file    Transportation needs     Medical: Not on file     Non-medical: Not on file   Tobacco Use    Smoking status: Never Smoker    Smokeless tobacco: Never Used   Substance and Sexual Activity    Alcohol use: Yes     Frequency: 2-4 times a month     Drinks per session: 1 or 2     Binge frequency: Never    Drug use: Never    Sexual activity: Yes     Partners: Male     Birth control/protection: See Surgical Hx, Surgical   Lifestyle    Physical activity     Days per week: Not on file     Minutes per session: Not on file    Stress: Not on file   Relationships    Social connections     Talks on phone: Not on file     Gets together: Not on file     Attends Sabianist service: Not on file     Active member of club or organization: Not on file     Attends meetings of clubs or organizations: Not on file     Relationship status: Not on file   Other Topics Concern    Not on file   Social History Narrative    Not on file       Family History   Problem Relation Age of Onset    Cancer Paternal Aunt         ovarian cancer - 50    Cancer Other         ovarian cancer - 55       Outpatient Encounter Medications as of 7/31/2020   Medication Sig Dispense Refill    budesonide (PULMICORT) 0.5 mg/2 mL nebulizer solution EMPTY CONTENTS OF 1 RESPULE INTO NASAL IRRIGATION SYSTEM, ADD DISTILLED WATER, SALT PACK, MIX & IRRIGATE. PERFORM TWICE DAILY  1    levocetirizine (XYZAL) 5 MG tablet Take 1 tablet (5 mg total) by mouth  every evening. 30 tablet 3    mupirocin (BACTROBAN) 2 % ointment EMPTY 1 TUBE (22 GRAMS) INTO NASAL IRRIGATION SYSTEM. ADD DISTILLED WATER, SALT PACKET, MIX & IRRIGATE - PERFORM 2 TIMES DAILY      propranoloL (INDERAL) 20 MG tablet Take 1 tablet (20 mg total) by mouth 3 (three) times daily. 90 tablet 2     No facility-administered encounter medications on file as of 7/31/2020.        Physical Exam     Vitals:    07/31/20 0922   BP: 110/76   Pulse: 78       Constitutional: Well appearing / communicating.  NAD.  Eyes: EOM I Bilaterally  Head/Face: Normocephalic.  Negative paranasal sinus pressure/tenderness.  Salivary glands WNL.  House Brackmann I Bilaterally.    Right Ear: External Auditory Canal WNL,TM w/o masses/lesions/perforations.  Auricle WNL.  Left Ear: External Auditory Canal WNL,TM w/o masses/lesions/perforations. Auricle WNL.  Nose: No gross nasal septal deviation. Inferior Turbinates 3+ bilaterally. No septal perforation. No masses/lesions. External nasal skin without masses/lesions.  Oral Cavity: Gingiva/lips WNL.  FOM Soft, no masses palpated. Oral Tongue mobile. Hard Palate WNL.   Oropharynx: BOT WNL. No masses/lesions noted. Tonsillar fossa/pharyngeal wall without lesions. Posterior oropharynx WNL.  Soft palate without masses. Midline uvula.   Neck/Lymphatic: No LAD I-VI bilaterally.  No thyromegaly.  No masses noted on exam.    Mirror laryngoscopy/nasopharyngoscopy: Active gag reflex.  Unable to perform.    Neuro/Psychiatric: AOx3.  Normal mood and affect.   Cardiovascular: Normal carotid pulses bilaterally, no increasing jugular venous distention noted at cervical region bilaterally.    Respiratory: Normal respiratory effort, no stridor, no retractions noted.    See separate procedure note for nasal endoscopy.       Assessment and Plan:  Socorro Palafox is a 34 y.o. 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  She was counseled on the off-label nature of this therapy and she consented to its use.    Follow up in 3-4 months    Ban Tracey MD  [facility]

## 2020-07-31 NOTE — PROCEDURES
Procedures       PROCEDURE NOTE:  Nasal endoscopy   Preprocedure diagnosis:  Chronic sinusitis  Postprocedure diangosis:  Same  Complications:  None  Blood Loss:  None    Procedure in detail:  After verbal consent was obtained, the patient's nasal cavity was anesthesized using topical 1%lidocaine and Neosynepherine.  A rigid 0 degree endoscope was placed in first the right, then the left nasal cavity.  The inferior and middle turbinates were examined, and found to have decreased edema and erythema bilaterally.  The middle meatus and maxillary antrum was also examined, and found to be normal bilaterally.  Purulent drainage is resolved. No polyps or  masses seen.  The patient tolerated the procedure well and there were no complications.

## 2020-08-16 RX ORDER — PROPRANOLOL HYDROCHLORIDE 20 MG/1
TABLET ORAL
Qty: 270 TABLET | Refills: 0 | Status: SHIPPED | OUTPATIENT
Start: 2020-08-16 | End: 2020-12-21

## 2020-09-29 ENCOUNTER — TELEPHONE (OUTPATIENT)
Dept: OTOLARYNGOLOGY | Facility: CLINIC | Age: 35
End: 2020-09-29

## 2020-09-29 NOTE — TELEPHONE ENCOUNTER
Spoke with [facility] did verbal order of mupirocin (BACTROBAN) 2 % ointment. Attempted to call patient no answer left voicemail that refill request was sent to pharmacy.

## 2020-09-29 NOTE — TELEPHONE ENCOUNTER
----- Message from Ban Tracey MD sent at 9/28/2020  5:54 PM CDT -----  Contact: Carlene with [facility]  Yes    ----- Message -----  From: Rachel Payne LPN  Sent: 9/28/2020   2:52 PM CDT  To: Ban Tracey MD    Patient calling in for a refill of medication with [facility]. Okay for refill?  ----- Message -----  From: Debra Bernal  Sent: 9/28/2020  10:00 AM CDT  To: Dave Cevallos Staff    Type:  RX Refill Request    Who Called:  Carlene  Refill or New Rx: refill  RX Name and Strength: mupirocin (BACTROBAN) 2 % ointment  How is the patient currently taking it? (ex. 1XDay): EMPTY 1 TUBE (22 GRAMS) INTO NASAL IRRIGATION SYSTEM. ADD DISTILLED WATER, SALT PACKET, MIX & IRRIGATE - PERFORM 2 TIMES DAILY  Is this a 30 day or 90 day RX: 30 day   Preferred Pharmacy with phone number: [facility] [address] [place] [address] Phone: [phone] option 4 Fax: [phone]  Local or Mail Order: local  Ordering Provider:   Would the patient rather a call back or a response via [facility]?  Call back   Best Call Back Number: [phone] option 4   Additional Information:  none

## 2020-10-05 ENCOUNTER — PATIENT MESSAGE (OUTPATIENT)
Dept: ADMINISTRATIVE | Facility: HOSPITAL | Age: 35
End: 2020-10-05

## 2020-10-21 ENCOUNTER — PATIENT MESSAGE (OUTPATIENT)
Dept: INTERNAL MEDICINE | Facility: CLINIC | Age: 35
End: 2020-10-21

## 2020-10-22 DIAGNOSIS — Z00.00 ANNUAL PHYSICAL EXAM: Primary | ICD-10-CM

## 2020-11-03 ENCOUNTER — PATIENT OUTREACH (OUTPATIENT)
Dept: ADMINISTRATIVE | Facility: HOSPITAL | Age: 35
End: 2020-11-03

## 2020-11-05 ENCOUNTER — LAB VISIT (OUTPATIENT)
Dept: LAB | Facility: HOSPITAL | Age: 35
End: 2020-11-05
Attending: INTERNAL MEDICINE
Payer: MEDICAID

## 2020-11-05 DIAGNOSIS — Z00.00 ANNUAL PHYSICAL EXAM: ICD-10-CM

## 2020-11-05 LAB
ALBUMIN SERPL BCP-MCNC: 3.8 G/DL (ref 3.5–5.2)
ALP SERPL-CCNC: 161 U/L (ref 55–135)
ALT SERPL W/O P-5'-P-CCNC: 37 U/L (ref 10–44)
ANION GAP SERPL CALC-SCNC: 13 MMOL/L (ref 8–16)
AST SERPL-CCNC: 39 U/L (ref 10–40)
BASOPHILS # BLD AUTO: 0.02 K/UL (ref 0–0.2)
BASOPHILS NFR BLD: 0.5 % (ref 0–1.9)
BILIRUB SERPL-MCNC: 1.5 MG/DL (ref 0.1–1)
BUN SERPL-MCNC: 7 MG/DL (ref 6–20)
CALCIUM SERPL-MCNC: 9.5 MG/DL (ref 8.7–10.5)
CHLORIDE SERPL-SCNC: 101 MMOL/L (ref 95–110)
CHOLEST SERPL-MCNC: 150 MG/DL (ref 120–199)
CHOLEST/HDLC SERPL: 4.2 {RATIO} (ref 2–5)
CO2 SERPL-SCNC: 20 MMOL/L (ref 23–29)
CREAT SERPL-MCNC: 0.8 MG/DL (ref 0.5–1.4)
DIFFERENTIAL METHOD: ABNORMAL
EOSINOPHIL # BLD AUTO: 0.1 K/UL (ref 0–0.5)
EOSINOPHIL NFR BLD: 1.4 % (ref 0–8)
ERYTHROCYTE [DISTWIDTH] IN BLOOD BY AUTOMATED COUNT: 13.9 % (ref 11.5–14.5)
EST. GFR  (AFRICAN AMERICAN): >60 ML/MIN/1.73 M^2
EST. GFR  (NON AFRICAN AMERICAN): >60 ML/MIN/1.73 M^2
GLUCOSE SERPL-MCNC: 90 MG/DL (ref 70–110)
HCT VFR BLD AUTO: 42.6 % (ref 37–48.5)
HDLC SERPL-MCNC: 36 MG/DL (ref 40–75)
HDLC SERPL: 24 % (ref 20–50)
HGB BLD-MCNC: 13.3 G/DL (ref 12–16)
IMM GRANULOCYTES # BLD AUTO: 0.02 K/UL (ref 0–0.04)
IMM GRANULOCYTES NFR BLD AUTO: 0.5 % (ref 0–0.5)
LDLC SERPL CALC-MCNC: 99.8 MG/DL (ref 63–159)
LYMPHOCYTES # BLD AUTO: 1 K/UL (ref 1–4.8)
LYMPHOCYTES NFR BLD: 23 % (ref 18–48)
MCH RBC QN AUTO: 26.2 PG (ref 27–31)
MCHC RBC AUTO-ENTMCNC: 31.2 G/DL (ref 32–36)
MCV RBC AUTO: 84 FL (ref 82–98)
MONOCYTES # BLD AUTO: 0.5 K/UL (ref 0.3–1)
MONOCYTES NFR BLD: 11.4 % (ref 4–15)
NEUTROPHILS # BLD AUTO: 2.7 K/UL (ref 1.8–7.7)
NEUTROPHILS NFR BLD: 63.2 % (ref 38–73)
NONHDLC SERPL-MCNC: 114 MG/DL
NRBC BLD-RTO: 0 /100 WBC
PLATELET # BLD AUTO: 166 K/UL (ref 150–350)
PMV BLD AUTO: 9.7 FL (ref 9.2–12.9)
POTASSIUM SERPL-SCNC: 4.2 MMOL/L (ref 3.5–5.1)
PROT SERPL-MCNC: 7.7 G/DL (ref 6–8.4)
RBC # BLD AUTO: 5.07 M/UL (ref 4–5.4)
SODIUM SERPL-SCNC: 134 MMOL/L (ref 136–145)
TRIGL SERPL-MCNC: 71 MG/DL (ref 30–150)
TSH SERPL DL<=0.005 MIU/L-ACNC: 1.14 UIU/ML (ref 0.4–4)
WBC # BLD AUTO: 4.31 K/UL (ref 3.9–12.7)

## 2020-11-05 PROCEDURE — 80061 LIPID PANEL: CPT

## 2020-11-05 PROCEDURE — 80053 COMPREHEN METABOLIC PANEL: CPT

## 2020-11-05 PROCEDURE — 36415 COLL VENOUS BLD VENIPUNCTURE: CPT | Mod: PO

## 2020-11-05 PROCEDURE — 84443 ASSAY THYROID STIM HORMONE: CPT

## 2020-11-05 PROCEDURE — 85025 COMPLETE CBC W/AUTO DIFF WBC: CPT

## 2020-11-05 PROCEDURE — 83036 HEMOGLOBIN GLYCOSYLATED A1C: CPT

## 2020-11-06 ENCOUNTER — TELEPHONE (OUTPATIENT)
Dept: INTERNAL MEDICINE | Facility: CLINIC | Age: 35
End: 2020-11-06

## 2020-11-06 DIAGNOSIS — R74.8 ELEVATED ALKALINE PHOSPHATASE LEVEL: Primary | ICD-10-CM

## 2020-11-06 LAB
ESTIMATED AVG GLUCOSE: 97 MG/DL (ref 68–131)
HBA1C MFR BLD HPLC: 5 % (ref 4–5.6)

## 2020-11-06 NOTE — TELEPHONE ENCOUNTER
A1c indicates you are not diabetic.  Your thyroid function, electrolytes, kidney function, cholesterol, blood counts are normal.  Appointment in a week.  Liver function is off.  I am going to order further lab work, and an ultrasound to evaluate further.

## 2020-11-10 ENCOUNTER — PATIENT MESSAGE (OUTPATIENT)
Dept: INTERNAL MEDICINE | Facility: CLINIC | Age: 35
End: 2020-11-10

## 2020-11-17 ENCOUNTER — OFFICE VISIT (OUTPATIENT)
Dept: INTERNAL MEDICINE | Facility: CLINIC | Age: 35
End: 2020-11-17
Payer: MEDICAID

## 2020-11-17 ENCOUNTER — LAB VISIT (OUTPATIENT)
Dept: LAB | Facility: HOSPITAL | Age: 35
End: 2020-11-17
Attending: INTERNAL MEDICINE
Payer: MEDICAID

## 2020-11-17 VITALS
WEIGHT: 147.94 LBS | HEART RATE: 91 BPM | SYSTOLIC BLOOD PRESSURE: 130 MMHG | TEMPERATURE: 98 F | OXYGEN SATURATION: 99 % | BODY MASS INDEX: 23.22 KG/M2 | DIASTOLIC BLOOD PRESSURE: 70 MMHG | HEIGHT: 67 IN

## 2020-11-17 DIAGNOSIS — I95.9 HYPOTENSION, UNSPECIFIED HYPOTENSION TYPE: ICD-10-CM

## 2020-11-17 DIAGNOSIS — R74.8 ELEVATED ALKALINE PHOSPHATASE LEVEL: ICD-10-CM

## 2020-11-17 DIAGNOSIS — Z00.00 ANNUAL PHYSICAL EXAM: Primary | ICD-10-CM

## 2020-11-17 LAB
ALBUMIN SERPL BCP-MCNC: 3.8 G/DL (ref 3.5–5.2)
ALP SERPL-CCNC: 174 U/L (ref 55–135)
ALT SERPL W/O P-5'-P-CCNC: 32 U/L (ref 10–44)
ANION GAP SERPL CALC-SCNC: 9 MMOL/L (ref 8–16)
AST SERPL-CCNC: 35 U/L (ref 10–40)
BILIRUB SERPL-MCNC: 0.7 MG/DL (ref 0.1–1)
BUN SERPL-MCNC: 7 MG/DL (ref 6–20)
CALCIUM SERPL-MCNC: 9.4 MG/DL (ref 8.7–10.5)
CHLORIDE SERPL-SCNC: 99 MMOL/L (ref 95–110)
CO2 SERPL-SCNC: 29 MMOL/L (ref 23–29)
CREAT SERPL-MCNC: 0.8 MG/DL (ref 0.5–1.4)
EST. GFR  (AFRICAN AMERICAN): >60 ML/MIN/1.73 M^2
EST. GFR  (NON AFRICAN AMERICAN): >60 ML/MIN/1.73 M^2
GLUCOSE SERPL-MCNC: 110 MG/DL (ref 70–110)
POTASSIUM SERPL-SCNC: 3.8 MMOL/L (ref 3.5–5.1)
PROT SERPL-MCNC: 7.6 G/DL (ref 6–8.4)
SODIUM SERPL-SCNC: 137 MMOL/L (ref 136–145)

## 2020-11-17 PROCEDURE — 84075 ASSAY ALKALINE PHOSPHATASE: CPT

## 2020-11-17 PROCEDURE — 99395 PREV VISIT EST AGE 18-39: CPT | Mod: S$PBB,,, | Performed by: INTERNAL MEDICINE

## 2020-11-17 PROCEDURE — 99999 PR PBB SHADOW E&M-EST. PATIENT-LVL IV: CPT | Mod: PBBFAC,,, | Performed by: INTERNAL MEDICINE

## 2020-11-17 PROCEDURE — 80053 COMPREHEN METABOLIC PANEL: CPT

## 2020-11-17 PROCEDURE — 99214 OFFICE O/P EST MOD 30 MIN: CPT | Mod: PBBFAC,PO,25 | Performed by: INTERNAL MEDICINE

## 2020-11-17 PROCEDURE — 36415 COLL VENOUS BLD VENIPUNCTURE: CPT | Mod: PO

## 2020-11-17 PROCEDURE — 84080 ASSAY ALKALINE PHOSPHATASES: CPT

## 2020-11-17 PROCEDURE — 90686 IIV4 VACC NO PRSV 0.5 ML IM: CPT | Mod: PBBFAC,PO

## 2020-11-17 PROCEDURE — 99395 PR PREVENTIVE VISIT,EST,18-39: ICD-10-PCS | Mod: S$PBB,,, | Performed by: INTERNAL MEDICINE

## 2020-11-17 PROCEDURE — 99999 PR PBB SHADOW E&M-EST. PATIENT-LVL IV: ICD-10-PCS | Mod: PBBFAC,,, | Performed by: INTERNAL MEDICINE

## 2020-11-17 RX ORDER — SODIUM CHLORIDE/SODIUM BICARB
PACKET (EA) NASAL
COMMUNITY
Start: 2020-10-01 | End: 2020-12-21 | Stop reason: ALTCHOICE

## 2020-11-17 NOTE — PROGRESS NOTES
Subjective:       Patient ID: Socorro Palafox is a 35 y.o. female.    Chief Complaint: Annual Exam and Medication Problem (low)    HPI     35 y.o. female here for annual exam.     Cholesterol: WNL  Vaccines: Influenza - needs; Tetanus - done last 2013  Sexual Screening:   STD screening:  Eye exam: done last year  Mammogram: maternal aunt  Gyn exam: UTD  Colonoscopy: maternal aunt  A1c: 5    Exercise: would like to.  Diet:  Tries to cook at home as much as possible.  Eats out once a week.    She cannot exercise because of blood pressure issues.  About a month ago, her BP dropped into the 80s and was somnolent.  She has worn compression clothing and drinks a lot of fluid and takes in a lot of salt.  This brought her BP up.  As the BP went down, her HR went up.  She had chest pain and arm numbness.  She has had workups for this before.  This was worked up at [place].  She has had an echo and holter.  This was in 2014.    Past Medical History:   Diagnosis Date    History of cervical cancer      Past Surgical History:   Procedure Laterality Date    HYSTERECTOMY      pylori stenosis surgery      SINUS SURGERY      deviated septum surgery and turbinate reduction     Social History     Socioeconomic History    Marital status:      Spouse name: Not on file    Number of children: Not on file    Years of education: Not on file    Highest education level: Not on file   Occupational History    Not on file   Social Needs    Financial resource strain: Not very hard    Food insecurity     Worry: Never true     Inability: Never true    Transportation needs     Medical: No     Non-medical: No   Tobacco Use    Smoking status: Never Smoker    Smokeless tobacco: Never Used   Substance and Sexual Activity    Alcohol use: Yes     Alcohol/week: 1.0 standard drinks     Types: 1 Glasses of wine per week     Frequency: Monthly or less     Drinks per session: 1 or 2     Binge frequency: Never     Comment: once a month     Drug use: Never    Sexual activity: Yes     Partners: Male     Birth control/protection: See Surgical Hx   Lifestyle    Physical activity     Days per week: 0 days     Minutes per session: 0 min    Stress: Not at all   Relationships    Social connections     Talks on phone: Once a week     Gets together: Once a week     Attends [employer] service: Not on file     Active member of club or organization: No     Attends meetings of clubs or organizations: Never     Relationship status:    Other Topics Concern    Not on file   Social History Narrative    Not on file     Review of patient's allergies indicates:   Allergen Reactions    Codeine Shortness Of Breath    Hydrocodone Shortness Of Breath    Oxycontin [oxycodone] Shortness Of Breath    Zofran [ondansetron hcl (pf)] Shortness Of Breath     Socorro Palafox had no medications administered during this visit.    Review of Systems   Constitutional: Negative for activity change and unexpected weight change.   HENT: Negative for hearing loss, rhinorrhea and trouble swallowing.    Eyes: Negative for discharge and visual disturbance.   Respiratory: Positive for chest tightness and wheezing.    Cardiovascular: Positive for chest pain and palpitations.   Gastrointestinal: Negative for blood in stool, constipation, diarrhea and vomiting.   Endocrine: Negative for polydipsia and polyuria.   Genitourinary: Negative for difficulty urinating, dysuria, hematuria and menstrual problem.   Musculoskeletal: Negative for arthralgias, joint swelling and neck pain.   Neurological: Positive for weakness. Negative for headaches.   Psychiatric/Behavioral: Negative for confusion and dysphoric mood.         Objective:      Physical Exam  Vitals signs reviewed.   Constitutional:       Appearance: She is well-developed.   HENT:      Head: Normocephalic and atraumatic.      Mouth/Throat:      Pharynx: No oropharyngeal exudate.   Eyes:      General: No scleral icterus.         Right eye: No discharge.         Left eye: No discharge.      Pupils: Pupils are equal, round, and reactive to light.   Neck:      Musculoskeletal: Normal range of motion and neck supple.      Thyroid: No thyromegaly.      Trachea: No tracheal deviation.   Cardiovascular:      Rate and Rhythm: Normal rate and regular rhythm.      Heart sounds: Normal heart sounds. No murmur. No friction rub. No gallop.    Pulmonary:      Effort: Pulmonary effort is normal. No respiratory distress.      Breath sounds: Normal breath sounds. No wheezing or rales.   Chest:      Chest wall: No tenderness.   Abdominal:      General: Bowel sounds are normal. There is no distension.      Palpations: Abdomen is soft. There is no mass.      Tenderness: There is no abdominal tenderness. There is no guarding or rebound.   Musculoskeletal: Normal range of motion.         General: No tenderness.   Skin:     General: Skin is warm and dry.      Coloration: Skin is not pale.      Findings: No erythema or rash.   Neurological:      Mental Status: She is alert and oriented to person, place, and time.   Psychiatric:         Behavior: Behavior normal.         Assessment:       1. Annual physical exam    2. Hypotension, unspecified hypotension type        Plan:       1.  Reviewed lab work with patient.  Up-to-date on tetanus vaccine.  Flu vaccine given today.  Discussed diet and exercise.  2.  Recommend patient increase salt intake.  Refer to Cardiology.  Check 2D echo with color-flow Doppler, Holter monitor.  Hold off on exercising for now.

## 2020-11-19 ENCOUNTER — HOSPITAL ENCOUNTER (OUTPATIENT)
Dept: RADIOLOGY | Facility: HOSPITAL | Age: 35
Discharge: HOME | End: 2020-11-19
Attending: INTERNAL MEDICINE
Payer: MEDICAID

## 2020-11-19 DIAGNOSIS — R74.8 ELEVATED ALKALINE PHOSPHATASE LEVEL: ICD-10-CM

## 2020-11-19 PROCEDURE — 76705 ECHO EXAM OF ABDOMEN: CPT | Mod: 26,,, | Performed by: RADIOLOGY

## 2020-11-19 PROCEDURE — 76705 ECHO EXAM OF ABDOMEN: CPT | Mod: TC

## 2020-11-19 PROCEDURE — 76705 US ABDOMEN LIMITED: ICD-10-PCS | Mod: 26,,, | Performed by: RADIOLOGY

## 2020-11-24 ENCOUNTER — PATIENT OUTREACH (OUTPATIENT)
Dept: ADMINISTRATIVE | Facility: OTHER | Age: 35
End: 2020-11-24

## 2020-11-24 NOTE — PROGRESS NOTES
Health Maintenance Due   Topic Date Due    Hepatitis C Screening  1985    HIV Screening  10/04/2000    TETANUS VACCINE  10/04/2003     Updates were requested from care everywhere.  Chart was reviewed for overdue Proactive [facility] Encounters (GAY) topics (CRS, Breast Cancer Screening, Eye exam)  Health Maintenance has been updated.  LINKS immunization registry triggered.  Immunizations were reconciled.

## 2020-11-25 LAB
ALP BONE CFR SERPL: 27 % (ref 28–66)
ALP INTEST CFR SERPL: 0 % (ref 1–24)
ALP ISOS SERPL-IMP: ABNORMAL
ALP LIVER CFR SERPL: 73 % (ref 25–69)
ALP MACROHEPATIC CFR SERPL: ABNORMAL %
ALP PLAC CFR SERPL: 0 %
ALP SERPL-CCNC: 155 U/L (ref 31–125)

## 2020-11-26 ENCOUNTER — TELEPHONE (OUTPATIENT)
Dept: INTERNAL MEDICINE | Facility: CLINIC | Age: 35
End: 2020-11-26

## 2020-11-26 DIAGNOSIS — R74.8 ELEVATED ALKALINE PHOSPHATASE LEVEL: Primary | ICD-10-CM

## 2020-11-26 NOTE — TELEPHONE ENCOUNTER
It looks like the alkaline phosphatase is coming from the liver.  However, your ultrasound was normal.  I am going to refer you to hepatology.

## 2020-11-27 ENCOUNTER — LAB VISIT (OUTPATIENT)
Dept: URGENT CARE | Facility: CLINIC | Age: 35
End: 2020-11-27
Payer: MEDICAID

## 2020-11-27 DIAGNOSIS — Z01.812 PRE-PROCEDURE LAB EXAM: ICD-10-CM

## 2020-11-27 PROCEDURE — U0003 INFECTIOUS AGENT DETECTION BY NUCLEIC ACID (DNA OR RNA); SEVERE ACUTE RESPIRATORY SYNDROME CORONAVIRUS 2 (SARS-COV-2) (CORONAVIRUS DISEASE [COVID-19]), AMPLIFIED PROBE TECHNIQUE, MAKING USE OF HIGH THROUGHPUT TECHNOLOGIES AS DESCRIBED BY CMS-2020-01-R: HCPCS

## 2020-11-28 LAB — SARS-COV-2 RNA RESP QL NAA+PROBE: NOT DETECTED

## 2020-11-30 ENCOUNTER — OFFICE VISIT (OUTPATIENT)
Dept: OTOLARYNGOLOGY | Facility: CLINIC | Age: 35
End: 2020-11-30
Payer: MEDICAID

## 2020-11-30 VITALS
SYSTOLIC BLOOD PRESSURE: 115 MMHG | WEIGHT: 149.25 LBS | HEART RATE: 64 BPM | HEIGHT: 67 IN | BODY MASS INDEX: 23.43 KG/M2 | DIASTOLIC BLOOD PRESSURE: 73 MMHG

## 2020-11-30 DIAGNOSIS — J31.0 CHRONIC RHINITIS: ICD-10-CM

## 2020-11-30 DIAGNOSIS — J32.8 OTHER CHRONIC SINUSITIS: Primary | ICD-10-CM

## 2020-11-30 DIAGNOSIS — J31.0 NONALLERGIC RHINITIS: ICD-10-CM

## 2020-11-30 DIAGNOSIS — Z01.812 PRE-PROCEDURE LAB EXAM: ICD-10-CM

## 2020-11-30 PROCEDURE — 99999 PR PBB SHADOW E&M-EST. PATIENT-LVL III: ICD-10-PCS | Mod: PBBFAC,,, | Performed by: OTOLARYNGOLOGY

## 2020-11-30 PROCEDURE — 31231 NASAL/SINUS ENDOSCOPY: ICD-10-PCS | Mod: S$PBB,,, | Performed by: OTOLARYNGOLOGY

## 2020-11-30 PROCEDURE — 31231 NASAL ENDOSCOPY DX: CPT | Mod: PBBFAC,PN | Performed by: OTOLARYNGOLOGY

## 2020-11-30 PROCEDURE — 99214 OFFICE O/P EST MOD 30 MIN: CPT | Mod: 25,S$PBB,, | Performed by: OTOLARYNGOLOGY

## 2020-11-30 PROCEDURE — 99214 PR OFFICE/OUTPT VISIT, EST, LEVL IV, 30-39 MIN: ICD-10-PCS | Mod: 25,S$PBB,, | Performed by: OTOLARYNGOLOGY

## 2020-11-30 PROCEDURE — 99999 PR PBB SHADOW E&M-EST. PATIENT-LVL III: CPT | Mod: PBBFAC,,, | Performed by: OTOLARYNGOLOGY

## 2020-11-30 PROCEDURE — 99213 OFFICE O/P EST LOW 20 MIN: CPT | Mod: PBBFAC,PN | Performed by: OTOLARYNGOLOGY

## 2020-11-30 RX ORDER — MOMETASONE FUROATE 50 UG/1
2 SPRAY, METERED NASAL DAILY
Qty: 17 G | Refills: 12 | Status: SHIPPED | OUTPATIENT
Start: 2020-11-30

## 2020-11-30 RX ORDER — LEVOCETIRIZINE DIHYDROCHLORIDE 5 MG/1
5 TABLET, FILM COATED ORAL NIGHTLY
Qty: 30 TABLET | Refills: 11 | Status: SHIPPED | OUTPATIENT
Start: 2020-11-30 | End: 2020-12-21 | Stop reason: ALTCHOICE

## 2020-11-30 NOTE — PROGRESS NOTES
Otolaryngology Clinic  Note    Socorro Palafox  Encounter Date: 11/30/2020   YOB: 1985  Referring Physician: No referring provider defined for this encounter.   PCP: Amanuel Rios MD    HPI: Socorro Palafox is a 35 y.o. female who reports 3 week history of post-nasal drip, nasal congestion, and sore throat.  She reports that the discharge has been thick and foul smelling.  She notes frontal pressure and headaches around her eyes.  She has had some left intermittent pressure. She reports decreased sense of smell and taste which has been present since her surgery. She has been using nasal saline rinses BID and Flonase sensimist without relief.  She has had bilateral aural fullness.     Interval HPI 11/30/2020:  Follow up chronic sinusitis. States that she did well on budesonide nasal saline rinses and stopped after course was completed. She thinks the compounded nasal were helpful. She reports improvement in facial pressure, post-nasal drip, rhinorrhea, and bilateral aural fullness.Currently she feels some nasal congestion and notes feeling of post-nasal drip. This comes and goes.       Review of Systems   Constitutional: Negative for chills and fever.   Eyes: Negative for blurred vision and double vision.   Respiratory: Negative for cough, hemoptysis and shortness of breath.    Cardiovascular: Negative for chest pain and palpitations.   Gastrointestinal: Negative for heartburn and nausea.   Skin: Negative for itching and rash.   Neurological: Negative for dizziness and seizures.   Endo/Heme/Allergies: Negative for environmental allergies. Does not bruise/bleed easily.   Psychiatric/Behavioral: Negative for depression and memory loss.        Review of patient's allergies indicates:   Allergen Reactions    Codeine Shortness Of Breath    Hydrocodone Shortness Of Breath    Oxycontin [oxycodone] Shortness Of Breath    Zofran [ondansetron hcl (pf)] Shortness Of Breath       Past Medical History:    Diagnosis Date    History of cervical cancer        Past Surgical History:   Procedure Laterality Date    HYSTERECTOMY      pylori stenosis surgery      SINUS SURGERY      deviated septum surgery and turbinate reduction       Social History     Socioeconomic History    Marital status:      Spouse name: Not on file    Number of children: Not on file    Years of education: Not on file    Highest education level: Not on file   Occupational History    Not on file   Social Needs    Financial resource strain: Not very hard    Food insecurity     Worry: Never true     Inability: Never true    Transportation needs     Medical: No     Non-medical: No   Tobacco Use    Smoking status: Never Smoker    Smokeless tobacco: Never Used   Substance and Sexual Activity    Alcohol use: Yes     Alcohol/week: 1.0 standard drinks     Types: 1 Glasses of wine per week     Frequency: Monthly or less     Drinks per session: 1 or 2     Binge frequency: Never     Comment: once a month    Drug use: Never    Sexual activity: Yes     Partners: Male     Birth control/protection: See Surgical Hx   Lifestyle    Physical activity     Days per week: 0 days     Minutes per session: 0 min    Stress: Not at all   Relationships    Social connections     Talks on phone: Once a week     Gets together: Once a week     Attends [employer] service: Not on file     Active member of club or organization: No     Attends meetings of clubs or organizations: Never     Relationship status:    Other Topics Concern    Not on file   Social History Narrative    Not on file       Family History   Problem Relation Age of Onset    Cancer Paternal Aunt         ovarian cancer - 50    Cancer Other         ovarian cancer - 55    Alcohol abuse Maternal Grandfather     Heart disease Maternal Grandfather     Alcohol abuse Maternal Uncle     Arthritis Mother     Drug abuse Mother     Stroke Mother     Cancer Maternal Aunt         Elsa Simpson;  colon cancer; another with breast cancer    Diabetes Maternal Uncle     Hypertension Father        Outpatient Encounter Medications as of 11/30/2020   Medication Sig Dispense Refill    NEILMED SINUS RINSE REFILL Pack use as directed      budesonide (PULMICORT) 0.5 mg/2 mL nebulizer solution EMPTY CONTENTS OF 1 RESPULE INTO NASAL IRRIGATION SYSTEM, ADD DISTILLED WATER, SALT PACK, MIX & IRRIGATE. PERFORM TWICE DAILY  1    levocetirizine (XYZAL) 5 MG tablet Take 1 tablet (5 mg total) by mouth every evening. (Patient not taking: Reported on 11/17/2020) 30 tablet 3    mupirocin (BACTROBAN) 2 % ointment EMPTY 1 TUBE (22 GRAMS) INTO NASAL IRRIGATION SYSTEM. ADD DISTILLED WATER, SALT PACKET, MIX & IRRIGATE - PERFORM 2 TIMES DAILY      propranoloL (INDERAL) 20 MG tablet TAKE 1 TABLET BY MOUTH 3 TIMES A DAY (Patient not taking: Reported on 11/17/2020) 270 tablet 0     No facility-administered encounter medications on file as of 11/30/2020.        Physical Exam     Vitals:    11/30/20 0943   BP: 115/73   Pulse: 64       Constitutional: Well appearing / communicating.  NAD.  Eyes: EOM I Bilaterally  Head/Face: Normocephalic.  Negative paranasal sinus pressure/tenderness.  Salivary glands WNL.  House Brackmann I Bilaterally.    Right Ear: External Auditory Canal WNL,TM w/o masses/lesions/perforations.  Auricle WNL.  Left Ear: External Auditory Canal WNL,TM w/o masses/lesions/perforations. Auricle WNL.  Nose: No gross nasal septal deviation. Inferior Turbinates 3+ bilaterally. No septal perforation. No masses/lesions. External nasal skin without masses/lesions.  Oral Cavity: Gingiva/lips WNL.  FOM Soft, no masses palpated. Oral Tongue mobile. Hard Palate WNL.   Oropharynx: BOT WNL. No masses/lesions noted. Tonsillar fossa/pharyngeal wall without lesions. Posterior oropharynx WNL.  Soft palate without masses. Midline uvula.   Neck/Lymphatic: No LAD I-VI bilaterally.  No thyromegaly.  No masses noted on exam.    Mirror  laryngoscopy/nasopharyngoscopy: Active gag reflex.  Unable to perform.    Neuro/Psychiatric: AOx3.  Normal mood and affect.   Cardiovascular: Normal carotid pulses bilaterally, no increasing jugular venous distention noted at cervical region bilaterally.    Respiratory: Normal respiratory effort, no stridor, no retractions noted.    See separate procedure note for nasal endoscopy.       Assessment and Plan:  Socorro Palafox is a 35 y.o. female with     Other chronic sinusitis    Nonallergic rhinitis    Chronic rhinitis         Continue nasal saline rinses BID  Start Nasonex 2 sprays per nostril daily  Continue xyzal 5mg PO daily    Follow up in 3-4 months    Marcie Tauzin Wilkinson, MD [facility]

## 2020-11-30 NOTE — PROCEDURES
Nasal/sinus endoscopy    Date/Time: 11/30/2020 9:40 AM  Performed by: Ban Tracey MD  Authorized by: Ban Tracey MD     Consent Done?:  Yes (Verbal)  Anesthesia:     Local anesthetic:  Lidocaine 2% and Feliz-Synephrine 1/2%  Nose:     Procedure Performed:  Nasal Endoscopy  External:      No external nasal deformity  Intranasal:      Mucosa no polyps     Mucosa ulcers not present     No mucosa lesions present     Turbinates not enlarged     No septum gross deformity  Nasopharynx:      No mucosa lesions     Adenoids not present     Posterior choanae patent     Eustachian tube patent     Bilateral maxillary antrostomy and ethmoid cavities widely patent; mild clear posterior nasal drainage.

## 2020-12-01 ENCOUNTER — HOSPITAL ENCOUNTER (OUTPATIENT)
Dept: CARDIOLOGY | Facility: HOSPITAL | Age: 35
Discharge: HOME | End: 2020-12-01
Attending: INTERNAL MEDICINE
Payer: MEDICAID

## 2020-12-01 VITALS
DIASTOLIC BLOOD PRESSURE: 72 MMHG | BODY MASS INDEX: 23.07 KG/M2 | HEIGHT: 67 IN | SYSTOLIC BLOOD PRESSURE: 112 MMHG | WEIGHT: 147 LBS | HEART RATE: 73 BPM

## 2020-12-01 DIAGNOSIS — I95.9 HYPOTENSION, UNSPECIFIED HYPOTENSION TYPE: ICD-10-CM

## 2020-12-01 LAB
ASCENDING AORTA: 2.97 CM
AV INDEX (PROSTH): 0.82
AV MEAN GRADIENT: 4 MMHG
AV PEAK GRADIENT: 6 MMHG
AV VALVE AREA: 2.48 CM2
AV VELOCITY RATIO: 0.9
BSA FOR ECHO PROCEDURE: 1.78 M2
CV ECHO LV RWT: 0.29 CM
DOP CALC AO PEAK VEL: 1.26 M/S
DOP CALC AO VTI: 28.14 CM
DOP CALC LVOT AREA: 3 CM2
DOP CALC LVOT DIAMETER: 1.97 CM
DOP CALC LVOT PEAK VEL: 1.13 M/S
DOP CALC LVOT STROKE VOLUME: 69.89 CM3
DOP CALCLVOT PEAK VEL VTI: 22.94 CM
E WAVE DECELERATION TIME: 204.63 MSEC
E/A RATIO: 1.48
E/E' RATIO: 5.94 M/S
ECHO LV POSTERIOR WALL: 0.63 CM (ref 0.6–1.1)
FRACTIONAL SHORTENING: 33 % (ref 28–44)
INTERVENTRICULAR SEPTUM: 0.65 CM (ref 0.6–1.1)
IVRT: 85.63 MSEC
LA MAJOR: 4.39 CM
LA MINOR: 4.42 CM
LA WIDTH: 3.91 CM
LEFT ATRIUM SIZE: 3.06 CM
LEFT ATRIUM VOLUME INDEX: 25.3 ML/M2
LEFT ATRIUM VOLUME: 44.8 CM3
LEFT INTERNAL DIMENSION IN SYSTOLE: 2.92 CM (ref 2.1–4)
LEFT VENTRICLE DIASTOLIC VOLUME INDEX: 48.78 ML/M2
LEFT VENTRICLE DIASTOLIC VOLUME: 86.53 ML
LEFT VENTRICLE MASS INDEX: 46 G/M2
LEFT VENTRICLE SYSTOLIC VOLUME INDEX: 18.4 ML/M2
LEFT VENTRICLE SYSTOLIC VOLUME: 32.68 ML
LEFT VENTRICULAR INTERNAL DIMENSION IN DIASTOLE: 4.38 CM (ref 3.5–6)
LEFT VENTRICULAR MASS: 81.55 G
LV LATERAL E/E' RATIO: 5 M/S
LV SEPTAL E/E' RATIO: 7.31 M/S
MV PEAK A VEL: 0.64 M/S
MV PEAK E VEL: 0.95 M/S
MV STENOSIS PRESSURE HALF TIME: 59.34 MS
MV VALVE AREA P 1/2 METHOD: 3.71 CM2
PISA TR MAX VEL: 2.03 M/S
PULM VEIN S/D RATIO: 0.88
PV PEAK D VEL: 0.6 M/S
PV PEAK S VEL: 0.53 M/S
RA MAJOR: 4.48 CM
RA PRESSURE: 3 MMHG
RA WIDTH: 3.34 CM
RIGHT VENTRICULAR END-DIASTOLIC DIMENSION: 2.64 CM
SINUS: 3.15 CM
STJ: 2.72 CM
TDI LATERAL: 0.19 M/S
TDI SEPTAL: 0.13 M/S
TDI: 0.16 M/S
TR MAX PG: 16 MMHG
TRICUSPID ANNULAR PLANE SYSTOLIC EXCURSION: 2.19 CM
TV REST PULMONARY ARTERY PRESSURE: 19 MMHG

## 2020-12-01 PROCEDURE — 93225 XTRNL ECG REC<48 HRS REC: CPT

## 2020-12-01 PROCEDURE — 93306 TTE W/DOPPLER COMPLETE: CPT | Mod: 26,,, | Performed by: INTERNAL MEDICINE

## 2020-12-01 PROCEDURE — 93227 XTRNL ECG REC<48 HR R&I: CPT | Mod: ,,, | Performed by: INTERNAL MEDICINE

## 2020-12-01 PROCEDURE — 93227 HOLTER MONITOR - 24 HOUR (CUPID ONLY): ICD-10-PCS | Mod: ,,, | Performed by: INTERNAL MEDICINE

## 2020-12-01 PROCEDURE — 93306 TTE W/DOPPLER COMPLETE: CPT

## 2020-12-01 PROCEDURE — 93306 ECHO (CUPID ONLY): ICD-10-PCS | Mod: 26,,, | Performed by: INTERNAL MEDICINE

## 2020-12-02 LAB
OHS CV EVENT MONITOR DAY: 0
OHS CV HOLTER LENGTH DECIMAL HOURS: 24
OHS CV HOLTER LENGTH HOURS: 24
OHS CV HOLTER LENGTH MINUTES: 0

## 2020-12-21 ENCOUNTER — OFFICE VISIT (OUTPATIENT)
Dept: CARDIOLOGY | Facility: CLINIC | Age: 35
End: 2020-12-21
Payer: MEDICAID

## 2020-12-21 VITALS
DIASTOLIC BLOOD PRESSURE: 65 MMHG | SYSTOLIC BLOOD PRESSURE: 111 MMHG | HEIGHT: 67 IN | BODY MASS INDEX: 23.25 KG/M2 | HEART RATE: 85 BPM | WEIGHT: 148.13 LBS | OXYGEN SATURATION: 97 %

## 2020-12-21 DIAGNOSIS — R07.9 CHEST PAIN OF UNCERTAIN ETIOLOGY: Primary | ICD-10-CM

## 2020-12-21 DIAGNOSIS — I95.9 HYPOTENSION, UNSPECIFIED HYPOTENSION TYPE: ICD-10-CM

## 2020-12-21 DIAGNOSIS — E87.1 HYPONATREMIA: ICD-10-CM

## 2020-12-21 DIAGNOSIS — R06.02 SHORTNESS OF BREATH: ICD-10-CM

## 2020-12-21 DIAGNOSIS — Z85.41 HISTORY OF CERVICAL CANCER: ICD-10-CM

## 2020-12-21 DIAGNOSIS — I49.1 PREMATURE ATRIAL CONTRACTIONS: ICD-10-CM

## 2020-12-21 DIAGNOSIS — R00.2 PALPITATIONS: ICD-10-CM

## 2020-12-21 DIAGNOSIS — M79.7 FIBROMYALGIA: ICD-10-CM

## 2020-12-21 DIAGNOSIS — I49.3 PVCS (PREMATURE VENTRICULAR CONTRACTIONS): ICD-10-CM

## 2020-12-21 PROCEDURE — 93010 EKG 12-LEAD: ICD-10-PCS | Mod: S$PBB,,, | Performed by: INTERNAL MEDICINE

## 2020-12-21 PROCEDURE — 99205 PR OFFICE/OUTPT VISIT, NEW, LEVL V, 60-74 MIN: ICD-10-PCS | Mod: 25,S$PBB,, | Performed by: INTERNAL MEDICINE

## 2020-12-21 PROCEDURE — 99205 OFFICE O/P NEW HI 60 MIN: CPT | Mod: 25,S$PBB,, | Performed by: INTERNAL MEDICINE

## 2020-12-21 PROCEDURE — 99999 PR PBB SHADOW E&M-EST. PATIENT-LVL III: ICD-10-PCS | Mod: PBBFAC,,, | Performed by: INTERNAL MEDICINE

## 2020-12-21 PROCEDURE — 99213 OFFICE O/P EST LOW 20 MIN: CPT | Mod: PBBFAC,PN | Performed by: INTERNAL MEDICINE

## 2020-12-21 PROCEDURE — 93010 ELECTROCARDIOGRAM REPORT: CPT | Mod: S$PBB,,, | Performed by: INTERNAL MEDICINE

## 2020-12-21 PROCEDURE — 99999 PR PBB SHADOW E&M-EST. PATIENT-LVL III: CPT | Mod: PBBFAC,,, | Performed by: INTERNAL MEDICINE

## 2020-12-21 PROCEDURE — 93005 ELECTROCARDIOGRAM TRACING: CPT | Mod: PBBFAC,PN | Performed by: INTERNAL MEDICINE

## 2020-12-21 RX ORDER — PANTOPRAZOLE SODIUM 40 MG/1
40 TABLET, DELAYED RELEASE ORAL DAILY
Qty: 30 TABLET | Refills: 11 | Status: SHIPPED | OUTPATIENT
Start: 2020-12-21 | End: 2020-12-21

## 2020-12-21 RX ORDER — PANTOPRAZOLE SODIUM 40 MG/1
40 TABLET, DELAYED RELEASE ORAL DAILY
Qty: 30 TABLET | Refills: 2 | Status: SHIPPED | OUTPATIENT
Start: 2020-12-21 | End: 2020-12-30 | Stop reason: SDUPTHER

## 2020-12-21 NOTE — LETTER
December 21, 2020      Amanuel Rios MD  [address]  [place] [address]           [facility] [address]  [facility] [address]  Phone: [phone]  Fax: [phone]          Patient: Socorro Palafox   MR Number: [id number]   YOB: 1985   Date of Visit: 12/21/2020       Dear Dr. Amanuel Rios:    Thank you for referring Socorro Palafox to me for evaluation. Attached you will find relevant portions of my assessment and plan of care.    If you have questions, please do not hesitate to call me. I look forward to following Socorro Palafox along with you.    Sincerely,    Josué Luke MD    Enclosure  CC:  No Recipients    If you would like to receive this communication electronically, please contact [email or web address] or [phone] to request more information on [employer] access.    For providers and/or their staff who would like to refer a patient to [facility], please contact us through our one-stop-shop provider referral line, [facility], at [phone].    If you feel you have received this communication in error or would no longer like to receive these types of communications, please e-mail [email or web address]

## 2020-12-21 NOTE — PROGRESS NOTES
"  Subjective:      Patient ID: Socorro Palafox is a 35 y.o. female.    Chief Complaint: Hypotension (Ref by Dr Amanuel Rios) and Tachycardia    HPI:  "I have been having low blood pressure issues"    "I had a holter monitor and it showed I was having tachycardia."    "I was having episodes of feeling really faint."  "When I would check my blood pressure at home it would be in the 80's systolic."    There is no hx of fainting.    "When I try to exercise it doesn't feel right."  "It feels like my heart is going to fast and then I get chest pain that radiates to the left arm."    The pain is retrosternal and lasts as long as the heart rate stays elevated.  The rapid heart rate lasts from 20 minutes to 24 hours in duration.  "It starts to get uncomfortable when the heart rate gets up to the 90's.  "When I am just walking the heart rate goes up to 136 bpm."  The fastest documented heart rate is 136 bpm.  "Sometimes the heart is out of rhythm when fast."    The rapid heart beat (and chest pains) occur almost daily.    Pt c/o shortness of breath when the heart rate is high.    Review of Systems   Cardiovascular: Positive for chest pain, dyspnea on exertion and palpitations. Negative for claudication, irregular heartbeat, leg swelling, near-syncope, orthopnea and syncope.      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    Pt does housework slowly.    "If I try to do housework quickly I then get problems.    Pt had pre-eclampsia with first child.    Pt has a hx of a blood clot left leg after a D and C which was treated with blood thinners while in the hosptal    Pt had repair of pyloric stenosis at age 6 months.    Pt was prescribed propranalol several years ago for migraines. The propranolol helped the migraines and tachycardia but lowered the blood pressure.    Pt had a similar problem with low blood pressure several years ago.    "I get pains in my chest -- a really sharp pain above the heart "  Which can " "last all day but with bursts of severe pain which last 30 minutes.    Pt has indigestion from greasy food.    Pt had a liver ultrasound due to an elevated total bilirubin and enzyme but "nothing was found"      Pt states she was unable to walk for a year after hysterectomy for cervical cancer in 2015.    Pt has a hx of fibromyalgia.    Prior to hysterectomy in 2015 pt used to run half marathons.    Pt was in a wheelchair for a year after hysterectomy.    In 2016 pt upgraded to a walker and in 2017 pt was upgraded to a cane.    Pt had had a general anesthesia.    During surgery pt had high fever to 106 and blood pressure went too low per pt.      "The muscles in my stomach wouldn't work."    "I had to get physical therapy to re-coordinate all my muscles."    Past Medical History:   Diagnosis Date    Asthma     Clotting disorder     gestational diabetes     History of cervical cancer         Past Surgical History:   Procedure Laterality Date    HYSTERECTOMY      pylori stenosis surgery      SINUS SURGERY      deviated septum surgery and turbinate reduction       Family History   Problem Relation Age of Onset    Cancer Paternal Aunt         ovarian cancer - 50    Cancer Other         ovarian cancer - 55    Alcohol abuse Maternal Grandfather     Heart disease Maternal Grandfather     Alcohol abuse Maternal Uncle     Arthritis Mother     Drug abuse Mother     Stroke Mother     Heart disease Mother     Heart failure Mother     Rheumatic fever Mother     Cancer Maternal Aunt         Leila; colon cancer; another with breast cancer    Diabetes Maternal Uncle     Hypertension Father        Social History     Socioeconomic History    Marital status:      Spouse name: Not on file    Number of children: Not on file    Years of education: Not on file    Highest education level: Not on file   Occupational History    Not on file   Social Needs    Financial resource strain: Not very hard    Food " insecurity     Worry: Never true     Inability: Never true    Transportation needs     Medical: No     Non-medical: No   Tobacco Use    Smoking status: Never Smoker    Smokeless tobacco: Never Used   Substance and Sexual Activity    Alcohol use: Yes     Alcohol/week: 1.0 standard drinks     Types: 1 Glasses of wine per week     Frequency: Monthly or less     Drinks per session: 1 or 2     Binge frequency: Never     Comment: once a month    Drug use: Never    Sexual activity: Yes     Partners: Male     Birth control/protection: See Surgical Hx   Lifestyle    Physical activity     Days per week: 0 days     Minutes per session: 0 min    Stress: Not at all   Relationships    Social connections     Talks on phone: Once a week     Gets together: Once a week     Attends Nondenominational service: Not on file     Active member of club or organization: No     Attends meetings of clubs or organizations: Never     Relationship status:    Other Topics Concern    Not on file   Social History Narrative    Not on file       Current Outpatient Medications on File Prior to Visit   Medication Sig Dispense Refill    mometasone (NASONEX) 50 mcg/actuation nasal spray 2 sprays by Nasal route once daily. (Patient not taking: Reported on 12/21/2020) 17 g 12    [DISCONTINUED] budesonide (PULMICORT) 0.5 mg/2 mL nebulizer solution EMPTY CONTENTS OF 1 RESPULE INTO NASAL IRRIGATION SYSTEM, ADD DISTILLED WATER, SALT PACK, MIX & IRRIGATE. PERFORM TWICE DAILY  1    [DISCONTINUED] levocetirizine (XYZAL) 5 MG tablet Take 1 tablet (5 mg total) by mouth every evening. 30 tablet 11    [DISCONTINUED] mupirocin (BACTROBAN) 2 % ointment EMPTY 1 TUBE (22 GRAMS) INTO NASAL IRRIGATION SYSTEM. ADD DISTILLED WATER, SALT PACKET, MIX & IRRIGATE - PERFORM 2 TIMES DAILY      [DISCONTINUED] NEILMED SINUS RINSE REFILL Pack use as directed      [DISCONTINUED] propranoloL (INDERAL) 20 MG tablet TAKE 1 TABLET BY MOUTH 3 TIMES A DAY (Patient not taking:  "Reported on 11/17/2020) 270 tablet 0     No current facility-administered medications on file prior to visit.        Review of patient's allergies indicates:   Allergen Reactions    Codeine Shortness Of Breath    Hydrocodone Shortness Of Breath    Oxycontin [oxycodone] Shortness Of Breath    Zofran [ondansetron hcl (pf)] Shortness Of Breath     Objective:     Vitals:    12/21/20 1310   BP: 111/65   BP Location: Left arm   Patient Position: Sitting   BP Method: Large (Automatic)   Pulse: 85   SpO2: 97%   Weight: 67.2 kg (148 lb 2.4 oz)   Height: 5' 7" (1.702 m)        Physical Exam   Constitutional: She is oriented to person, place, and time. She appears well-developed and well-nourished.   Eyes: No scleral icterus.   Neck: No JVD present. Carotid bruit is not present.   Cardiovascular: Normal rate and regular rhythm. Exam reveals no gallop.   No murmur heard.  Pulses:       Posterior tibial pulses are 2+ on the right side and 2+ on the left side.   Pulmonary/Chest: Breath sounds normal.   Abdominal: She exhibits no abdominal bruit, no ascites, no pulsatile midline mass and no mass. There is no hepatosplenomegaly. There is generalized abdominal tenderness. There is no rigidity, no rebound and no guarding.   Musculoskeletal:         General: No edema.   Neurological: She is alert and oriented to person, place, and time.   Skin: Skin is warm and dry.   Psychiatric: She has a normal mood and affect. Her behavior is normal. Judgment and thought content normal.   Vitals reviewed.     Ultrasound of abdomen last month: WNL:    Echocardiogram with doppler this month: WNL    Holter monitor this month WNL.  Rare PAC's and PVC's noted.    Hospital Outpatient Visit on 12/01/2020   Component Date Value Ref Range Status    Holter length hours 12/01/2020 24   Final    holter length minutes 12/01/2020 0   Final    holter length dec hours 12/01/2020 24.00   Final    Event Monitor Day 12/01/2020 0   Final   Hospital Outpatient " Visit on 12/01/2020   Component Date Value Ref Range Status    BSA 12/01/2020 1.78  m2 Final    TDI SEPTAL 12/01/2020 0.13  m/s Final    LV LATERAL E/E' RATIO 12/01/2020 5.00  m/s Final    LV SEPTAL E/E' RATIO 12/01/2020 7.31  m/s Final    LA WIDTH 12/01/2020 3.91  cm Final    TDI LATERAL 12/01/2020 0.19  m/s Final    LVIDd 12/01/2020 4.38  3.5 - 6.0 cm Final    IVS 12/01/2020 0.65  0.6 - 1.1 cm Final    Posterior Wall 12/01/2020 0.63  0.6 - 1.1 cm Final    LVIDs 12/01/2020 2.92  2.1 - 4.0 cm Final    FS 12/01/2020 33  28 - 44 % Final    LA volume 12/01/2020 44.80  cm3 Final    Sinus 12/01/2020 3.15  cm Final    STJ 12/01/2020 2.72  cm Final    Ascending aorta 12/01/2020 2.97  cm Final    LV mass 12/01/2020 81.55  g Final    LA size 12/01/2020 3.06  cm Final    RVDD 12/01/2020 2.64  cm Final    TAPSE 12/01/2020 2.19  cm Final    Left Ventricle Relative Wall Thick* 12/01/2020 0.29  cm Final    AV mean gradient 12/01/2020 4  mmHg Final    AV valve area 12/01/2020 2.48  cm2 Final    AV Velocity Ratio 12/01/2020 0.90   Final    AV index (prosthetic) 12/01/2020 0.82   Final    MV valve area p 1/2 method 12/01/2020 3.71  cm2 Final    E/A ratio 12/01/2020 1.48   Final    Mean e' 12/01/2020 0.16  m/s Final    E wave decelartion time 12/01/2020 204.63  msec Final    IVRT 12/01/2020 85.63  msec Final    Pulm vein S/D ratio 12/01/2020 0.88   Final    LVOT diameter 12/01/2020 1.97  cm Final    LVOT area 12/01/2020 3.0  cm2 Final    LVOT peak alpesh 12/01/2020 1.13  m/s Final    LVOT peak VTI 12/01/2020 22.94  cm Final    Ao peak alpesh 12/01/2020 1.26  m/s Final    Ao VTI 12/01/2020 28.14  cm Final    LVOT stroke volume 12/01/2020 69.89  cm3 Final    AV peak gradient 12/01/2020 6  mmHg Final    E/E' ratio 12/01/2020 5.94  m/s Final    MV Peak E Alpesh 12/01/2020 0.95  m/s Final    TR Max Alpesh 12/01/2020 2.03  m/s Final    MV stenosis pressure 1/2 time 12/01/2020 59.34  ms Final    MV Peak A Alpesh  12/01/2020 0.64  m/s Final    PV Peak S Alpesh 12/01/2020 0.53  m/s Final    PV Peak D Alpesh 12/01/2020 0.60  m/s Final    LV Systolic Volume 12/01/2020 32.68  mL Final    LV Systolic Volume Index 12/01/2020 18.4  mL/m2 Final    LV Diastolic Volume 12/01/2020 86.53  mL Final    LV Diastolic Volume Index 12/01/2020 48.78  mL/m2 Final    LA Volume Index 12/01/2020 25.3  mL/m2 Final    LV Mass Index 12/01/2020 46  g/m2 Final    RA Major Axis 12/01/2020 4.48  cm Final    Left Atrium Minor Axis 12/01/2020 4.42  cm Final    Left Atrium Major Axis 12/01/2020 4.39  cm Final    Triscuspid Valve Regurgitation Pea* 12/01/2020 16  mmHg Final    RA Width 12/01/2020 3.34  cm Final    Right Atrial Pressure (from IVC) 12/01/2020 3  mmHg Final    TV rest pulmonary artery pressure 12/01/2020 19  mmHg Final   Lab Visit on 11/27/2020   Component Date Value Ref Range Status    SARS-CoV2 (COVID-19) Qualitative P* 11/27/2020 Not Detected  Not Detected Final   Lab Visit on 11/17/2020   Component Date Value Ref Range Status    Sodium 11/17/2020 137  136 - 145 mmol/L Final    Potassium 11/17/2020 3.8  3.5 - 5.1 mmol/L Final    Chloride 11/17/2020 99  95 - 110 mmol/L Final    CO2 11/17/2020 29  23 - 29 mmol/L Final    Glucose 11/17/2020 110  70 - 110 mg/dL Final    BUN 11/17/2020 7  6 - 20 mg/dL Final    Creatinine 11/17/2020 0.8  0.5 - 1.4 mg/dL Final    Calcium 11/17/2020 9.4  8.7 - 10.5 mg/dL Final    Total Protein 11/17/2020 7.6  6.0 - 8.4 g/dL Final    Albumin 11/17/2020 3.8  3.5 - 5.2 g/dL Final    Total Bilirubin 11/17/2020 0.7  0.1 - 1.0 mg/dL Final    Alkaline Phosphatase 11/17/2020 174* 55 - 135 U/L Final    AST 11/17/2020 35  10 - 40 U/L Final    ALT 11/17/2020 32  10 - 44 U/L Final    Anion Gap 11/17/2020 9  8 - 16 mmol/L Final    eGFR if African American 11/17/2020 >60.0  >60 mL/min/1.73 m^2 Final    eGFR if non African American 11/17/2020 >60.0  >60 mL/min/1.73 m^2 Final    Alkaline Phosphatase,  Total 11/17/2020 155* 31 - 125 U/L Final    Bone, Alk Phos 11/17/2020 27* 28 - 66 % Final    Liver, Alk Phos 11/17/2020 73* 25 - 69 % Final    Intestine, Alk Phos 11/17/2020 0* 1 - 24 % Final    PLACENTAL ISOENZYME 11/17/2020 0  0 % Final    Macrohepatic Isoenzyme 11/17/2020 Test Not Performed   Final    Alkaline Phosphatase Isoenzyme Int* 11/17/2020 Test Not Performed   Final   Lab Visit on 11/05/2020   Component Date Value Ref Range Status    TSH 11/05/2020 1.144  0.400 - 4.000 uIU/mL Final    Hemoglobin A1C 11/05/2020 5.0  4.0 - 5.6 % Final    Estimated Avg Glucose 11/05/2020 97  68 - 131 mg/dL Final    WBC 11/05/2020 4.31  3.90 - 12.70 K/uL Final    RBC 11/05/2020 5.07  4.00 - 5.40 M/uL Final    Hemoglobin 11/05/2020 13.3  12.0 - 16.0 g/dL Final    Hematocrit 11/05/2020 42.6  37.0 - 48.5 % Final    MCV 11/05/2020 84  82 - 98 fL Final    MCH 11/05/2020 26.2* 27.0 - 31.0 pg Final    MCHC 11/05/2020 31.2* 32.0 - 36.0 g/dL Final    RDW 11/05/2020 13.9  11.5 - 14.5 % Final    Platelets 11/05/2020 166  150 - 350 K/uL Final    MPV 11/05/2020 9.7  9.2 - 12.9 fL Final    Immature Granulocytes 11/05/2020 0.5  0.0 - 0.5 % Final    Gran # (ANC) 11/05/2020 2.7  1.8 - 7.7 K/uL Final    Immature Grans (Abs) 11/05/2020 0.02  0.00 - 0.04 K/uL Final    Lymph # 11/05/2020 1.0  1.0 - 4.8 K/uL Final    Mono # 11/05/2020 0.5  0.3 - 1.0 K/uL Final    Eos # 11/05/2020 0.1  0.0 - 0.5 K/uL Final    Baso # 11/05/2020 0.02  0.00 - 0.20 K/uL Final    nRBC 11/05/2020 0  0 /100 WBC Final    Gran % 11/05/2020 63.2  38.0 - 73.0 % Final    Lymph % 11/05/2020 23.0  18.0 - 48.0 % Final    Mono % 11/05/2020 11.4  4.0 - 15.0 % Final    Eosinophil % 11/05/2020 1.4  0.0 - 8.0 % Final    Basophil % 11/05/2020 0.5  0.0 - 1.9 % Final    Differential Method 11/05/2020 Automated   Final    Sodium 11/05/2020 134* 136 - 145 mmol/L Final    Potassium 11/05/2020 4.2  3.5 - 5.1 mmol/L Final    Chloride 11/05/2020 101  95 -  110 mmol/L Final    CO2 11/05/2020 20* 23 - 29 mmol/L Final    Glucose 11/05/2020 90  70 - 110 mg/dL Final    BUN 11/05/2020 7  6 - 20 mg/dL Final    Creatinine 11/05/2020 0.8  0.5 - 1.4 mg/dL Final    Calcium 11/05/2020 9.5  8.7 - 10.5 mg/dL Final    Total Protein 11/05/2020 7.7  6.0 - 8.4 g/dL Final    Albumin 11/05/2020 3.8  3.5 - 5.2 g/dL Final    Total Bilirubin 11/05/2020 1.5* 0.1 - 1.0 mg/dL Final    Alkaline Phosphatase 11/05/2020 161* 55 - 135 U/L Final    AST 11/05/2020 39  10 - 40 U/L Final    ALT 11/05/2020 37  10 - 44 U/L Final    Anion Gap 11/05/2020 13  8 - 16 mmol/L Final    eGFR if African American 11/05/2020 >60.0  >60 mL/min/1.73 m^2 Final    eGFR if non African American 11/05/2020 >60.0  >60 mL/min/1.73 m^2 Final    Cholesterol 11/05/2020 150  120 - 199 mg/dL Final    Triglycerides 11/05/2020 71  30 - 150 mg/dL Final    HDL 11/05/2020 36* 40 - 75 mg/dL Final    LDL Cholesterol 11/05/2020 99.8  63.0 - 159.0 mg/dL Final    HDL/Cholesterol Ratio 11/05/2020 24.0  20.0 - 50.0 % Final    Total Cholesterol/HDL Ratio 11/05/2020 4.2  2.0 - 5.0 Final    Non-HDL Cholesterol 11/05/2020 114  mg/dL Final   Lab Visit on 07/28/2020   Component Date Value Ref Range Status    SARS-CoV2 (COVID-19) Qualitative P* 07/28/2020 Not Detected  Not Detected Final   Office Visit on 06/24/2020   Component Date Value Ref Range Status    Aerobic Bacterial Culture 06/24/2020 No significant isolate   Final   (    Wt down 18 lbs over the past year      Assessment:     1. Chest pain of uncertain etiology    2. Hypotension, unspecified hypotension type    3. Palpitations    4. Shortness of breath    5. PVCs (premature ventricular contractions)    6. Premature atrial contractions    7. Hyponatremia    8. Fibromyalgia    9. History of cervical cancer      Plan:   [place] was seen today for hypotension and tachycardia.    Diagnoses and all orders for this visit:    Chest pain of uncertain etiology  -     IN  OFFICE EKG 12-LEAD (to [facility])  -     X-Ray Chest PA And Lateral; Future  -     Exercise Stress - EKG  -     D-DIMER, QUANTITATIVE; Future    Hypotension, unspecified hypotension type  -     Ambulatory referral/consult to Cardiology  -     IN OFFICE EKG 12-LEAD (to [facility])  -     Exercise Stress - EKG  -     D-DIMER, QUANTITATIVE; Future    Palpitations  -     Exercise Stress - EKG  -     D-DIMER, QUANTITATIVE; Future    Shortness of breath  -     X-Ray Chest PA And Lateral; Future  -     Exercise Stress - EKG  -     D-DIMER, QUANTITATIVE; Future    PVCs (premature ventricular contractions)  -     Exercise Stress - EKG  -     D-DIMER, QUANTITATIVE; Future    Premature atrial contractions  -     D-DIMER, QUANTITATIVE; Future    Hyponatremia    Fibromyalgia    History of cervical cancer    Other orders  -     Discontinue: pantoprazole (PROTONIX) 40 MG tablet; Take 1 tablet (40 mg total) by mouth once daily.  -     pantoprazole (PROTONIX) 40 MG tablet; Take 1 tablet (40 mg total) by mouth once daily.     Pt instructed to increase salt intake and wear support stockings.  Risks of hyponatremia from excessive water intake discussed    Discussed use of midodrine if symptoms were to become more severe    The chest pain may be GERD or a cervical radiculopathy    The palpitations are likely due to sinus tachycardia and rare PAC's and PVC's    The shortness of breath may be due to asthma    Stress ECG.  Risk of false positive result discussed    CXR    D Dimer     Empiric trial of pantoprazole 40 mg daily for 6 weeks.    Consider GI consult    RTC one month    Follow up in about 4 weeks (around 1/18/2021).     Will get records from [facility]

## 2020-12-31 RX ORDER — PANTOPRAZOLE SODIUM 40 MG/1
40 TABLET, DELAYED RELEASE ORAL DAILY
Qty: 30 TABLET | Refills: 2 | Status: SHIPPED | OUTPATIENT
Start: 2020-12-31 | End: 2021-12-31

## 2021-01-04 ENCOUNTER — TELEPHONE (OUTPATIENT)
Dept: CARDIOLOGY | Facility: CLINIC | Age: 36
End: 2021-01-04

## 2021-01-04 LAB
CV STRESS BASE HR: 81 BPM
DIASTOLIC BLOOD PRESSURE: 76 MMHG
OHS CV CPX 1 MINUTE RECOVERY HEART RATE: 146 BPM
OHS CV CPX 85 PERCENT MAX PREDICTED HEART RATE MALE: 149
OHS CV CPX ESTIMATED METS: 10.1
OHS CV CPX MAX PREDICTED HEART RATE: 175
OHS CV CPX PATIENT IS FEMALE: 1
OHS CV CPX PATIENT IS MALE: 0
OHS CV CPX PEAK DIASTOLIC BLOOD PRESSURE: 61 MMHG
OHS CV CPX PEAK HEAR RATE: 180 BPM
OHS CV CPX PEAK RATE PRESSURE PRODUCT: NORMAL
OHS CV CPX PEAK SYSTOLIC BLOOD PRESSURE: 127 MMHG
OHS CV CPX PERCENT MAX PREDICTED HEART RATE ACHIEVED: 103
OHS CV CPX RATE PRESSURE PRODUCT PRESENTING: NORMAL
STRESS ECHO POST EXERCISE DUR MIN: 7 MINUTES
STRESS ECHO POST EXERCISE DUR SEC: 0 SECONDS
SYSTOLIC BLOOD PRESSURE: 124 MMHG

## 2021-01-27 ENCOUNTER — OFFICE VISIT (OUTPATIENT)
Dept: CARDIOLOGY | Facility: CLINIC | Age: 36
End: 2021-01-27
Payer: MEDICAID

## 2021-01-27 VITALS
HEIGHT: 67 IN | SYSTOLIC BLOOD PRESSURE: 112 MMHG | BODY MASS INDEX: 22.68 KG/M2 | WEIGHT: 144.5 LBS | HEART RATE: 70 BPM | OXYGEN SATURATION: 100 % | DIASTOLIC BLOOD PRESSURE: 67 MMHG

## 2021-01-27 DIAGNOSIS — I95.1 ORTHOSTATIC HYPOTENSION: ICD-10-CM

## 2021-01-27 DIAGNOSIS — I49.3 PVCS (PREMATURE VENTRICULAR CONTRACTIONS): ICD-10-CM

## 2021-01-27 DIAGNOSIS — I49.1 PREMATURE ATRIAL CONTRACTIONS: ICD-10-CM

## 2021-01-27 DIAGNOSIS — R00.2 PALPITATIONS: Primary | ICD-10-CM

## 2021-01-27 PROCEDURE — 99999 PR PBB SHADOW E&M-EST. PATIENT-LVL III: ICD-10-PCS | Mod: PBBFAC,,, | Performed by: INTERNAL MEDICINE

## 2021-01-27 PROCEDURE — 99999 PR PBB SHADOW E&M-EST. PATIENT-LVL III: CPT | Mod: PBBFAC,,, | Performed by: INTERNAL MEDICINE

## 2021-01-27 PROCEDURE — 99214 PR OFFICE/OUTPT VISIT, EST, LEVL IV, 30-39 MIN: ICD-10-PCS | Mod: S$PBB,,, | Performed by: INTERNAL MEDICINE

## 2021-01-27 PROCEDURE — 99213 OFFICE O/P EST LOW 20 MIN: CPT | Mod: PBBFAC,PN | Performed by: INTERNAL MEDICINE

## 2021-01-27 PROCEDURE — 99214 OFFICE O/P EST MOD 30 MIN: CPT | Mod: S$PBB,,, | Performed by: INTERNAL MEDICINE

## 2021-02-09 ENCOUNTER — DOCUMENTATION ONLY (OUTPATIENT)
Dept: TRANSPLANT | Facility: CLINIC | Age: 36
End: 2021-02-09

## 2021-02-09 ENCOUNTER — PATIENT MESSAGE (OUTPATIENT)
Dept: HEPATOLOGY | Facility: CLINIC | Age: 36
End: 2021-02-09

## 2021-03-02 ENCOUNTER — TELEPHONE (OUTPATIENT)
Dept: NEUROLOGY | Facility: CLINIC | Age: 36
End: 2021-03-02

## 2021-03-29 ENCOUNTER — PATIENT OUTREACH (OUTPATIENT)
Dept: ADMINISTRATIVE | Facility: OTHER | Age: 36
End: 2021-03-29

## 2021-03-31 ENCOUNTER — OFFICE VISIT (OUTPATIENT)
Dept: CARDIOLOGY | Facility: CLINIC | Age: 36
End: 2021-03-31
Payer: MEDICAID

## 2021-03-31 ENCOUNTER — OFFICE VISIT (OUTPATIENT)
Dept: OTOLARYNGOLOGY | Facility: CLINIC | Age: 36
End: 2021-03-31
Payer: MEDICAID

## 2021-03-31 VITALS
DIASTOLIC BLOOD PRESSURE: 72 MMHG | HEART RATE: 82 BPM | WEIGHT: 142.88 LBS | BODY MASS INDEX: 22.43 KG/M2 | HEIGHT: 67 IN | SYSTOLIC BLOOD PRESSURE: 104 MMHG

## 2021-03-31 VITALS
HEIGHT: 67 IN | BODY MASS INDEX: 22.15 KG/M2 | DIASTOLIC BLOOD PRESSURE: 66 MMHG | OXYGEN SATURATION: 99 % | HEART RATE: 63 BPM | SYSTOLIC BLOOD PRESSURE: 110 MMHG | WEIGHT: 141.13 LBS

## 2021-03-31 DIAGNOSIS — I49.1 PREMATURE ATRIAL CONTRACTIONS: ICD-10-CM

## 2021-03-31 DIAGNOSIS — I95.1 ORTHOSTATIC HYPOTENSION: Primary | ICD-10-CM

## 2021-03-31 DIAGNOSIS — J31.0 NONALLERGIC RHINITIS: Chronic | ICD-10-CM

## 2021-03-31 DIAGNOSIS — J31.0 CHRONIC RHINITIS: ICD-10-CM

## 2021-03-31 DIAGNOSIS — R00.2 PALPITATIONS: ICD-10-CM

## 2021-03-31 DIAGNOSIS — I49.3 PVCS (PREMATURE VENTRICULAR CONTRACTIONS): ICD-10-CM

## 2021-03-31 DIAGNOSIS — J35.02 ADENOIDITIS: ICD-10-CM

## 2021-03-31 DIAGNOSIS — J32.8 OTHER CHRONIC SINUSITIS: Primary | Chronic | ICD-10-CM

## 2021-03-31 PROCEDURE — 99213 OFFICE O/P EST LOW 20 MIN: CPT | Mod: PBBFAC,25,27,PN | Performed by: INTERNAL MEDICINE

## 2021-03-31 PROCEDURE — 99999 PR PBB SHADOW E&M-EST. PATIENT-LVL III: ICD-10-PCS | Mod: PBBFAC,,, | Performed by: OTOLARYNGOLOGY

## 2021-03-31 PROCEDURE — 31231 NASAL ENDOSCOPY DX: CPT | Mod: PBBFAC,PN | Performed by: OTOLARYNGOLOGY

## 2021-03-31 PROCEDURE — 99214 OFFICE O/P EST MOD 30 MIN: CPT | Mod: S$PBB,,, | Performed by: INTERNAL MEDICINE

## 2021-03-31 PROCEDURE — 99999 PR PBB SHADOW E&M-EST. PATIENT-LVL III: ICD-10-PCS | Mod: PBBFAC,,, | Performed by: INTERNAL MEDICINE

## 2021-03-31 PROCEDURE — 99999 PR PBB SHADOW E&M-EST. PATIENT-LVL III: CPT | Mod: PBBFAC,,, | Performed by: OTOLARYNGOLOGY

## 2021-03-31 PROCEDURE — 31231 NASAL/SINUS ENDOSCOPY: ICD-10-PCS | Mod: S$PBB,,, | Performed by: OTOLARYNGOLOGY

## 2021-03-31 PROCEDURE — 99214 OFFICE O/P EST MOD 30 MIN: CPT | Mod: 25,S$PBB,, | Performed by: OTOLARYNGOLOGY

## 2021-03-31 PROCEDURE — 99214 PR OFFICE/OUTPT VISIT, EST, LEVL IV, 30-39 MIN: ICD-10-PCS | Mod: 25,S$PBB,, | Performed by: OTOLARYNGOLOGY

## 2021-03-31 PROCEDURE — 99999 PR PBB SHADOW E&M-EST. PATIENT-LVL III: CPT | Mod: PBBFAC,,, | Performed by: INTERNAL MEDICINE

## 2021-03-31 PROCEDURE — 99214 PR OFFICE/OUTPT VISIT, EST, LEVL IV, 30-39 MIN: ICD-10-PCS | Mod: S$PBB,,, | Performed by: INTERNAL MEDICINE

## 2021-03-31 PROCEDURE — 99213 OFFICE O/P EST LOW 20 MIN: CPT | Mod: PBBFAC,PN | Performed by: OTOLARYNGOLOGY

## 2021-03-31 RX ORDER — AMOXICILLIN AND CLAVULANATE POTASSIUM 875; 125 MG/1; MG/1
1 TABLET, FILM COATED ORAL 2 TIMES DAILY
Qty: 20 TABLET | Refills: 0 | Status: SHIPPED | OUTPATIENT
Start: 2021-03-31 | End: 2021-04-10

## 2021-05-24 ENCOUNTER — PATIENT MESSAGE (OUTPATIENT)
Dept: OTOLARYNGOLOGY | Facility: CLINIC | Age: 36
End: 2021-05-24

## 2022-01-18 ENCOUNTER — PATIENT MESSAGE (OUTPATIENT)
Dept: ADMINISTRATIVE | Facility: HOSPITAL | Age: 37
End: 2022-01-18
Payer: MEDICAID

## 2022-03-16 ENCOUNTER — PATIENT MESSAGE (OUTPATIENT)
Dept: ADMINISTRATIVE | Facility: HOSPITAL | Age: 37
End: 2022-03-16
Payer: MEDICAID

## 2024-01-29 NOTE — PROGRESS NOTES
Chart review done.  updated. Immunizations reviewed & updated. Care Everywhere updated.     spontaneous